# Patient Record
Sex: FEMALE | HISPANIC OR LATINO | Employment: FULL TIME | URBAN - METROPOLITAN AREA
[De-identification: names, ages, dates, MRNs, and addresses within clinical notes are randomized per-mention and may not be internally consistent; named-entity substitution may affect disease eponyms.]

---

## 2018-04-05 ENCOUNTER — OFFICE VISIT (OUTPATIENT)
Dept: FAMILY MEDICINE CLINIC | Facility: CLINIC | Age: 38
End: 2018-04-05

## 2018-04-05 ENCOUNTER — LAB (OUTPATIENT)
Dept: LAB | Facility: HOSPITAL | Age: 38
End: 2018-04-05

## 2018-04-05 VITALS
HEART RATE: 73 BPM | WEIGHT: 184.6 LBS | OXYGEN SATURATION: 98 % | RESPIRATION RATE: 18 BRPM | HEIGHT: 66 IN | BODY MASS INDEX: 29.67 KG/M2 | DIASTOLIC BLOOD PRESSURE: 74 MMHG | SYSTOLIC BLOOD PRESSURE: 116 MMHG

## 2018-04-05 DIAGNOSIS — Z00.00 HEALTHCARE MAINTENANCE: Primary | ICD-10-CM

## 2018-04-05 DIAGNOSIS — Z00.00 HEALTHCARE MAINTENANCE: ICD-10-CM

## 2018-04-05 DIAGNOSIS — K21.9 GASTROESOPHAGEAL REFLUX DISEASE WITHOUT ESOPHAGITIS: ICD-10-CM

## 2018-04-05 LAB
25(OH)D3 SERPL-MCNC: 19.5 NG/ML
ALBUMIN SERPL-MCNC: 4.5 G/DL (ref 3.2–4.8)
ALBUMIN/GLOB SERPL: 1.5 G/DL (ref 1.5–2.5)
ALP SERPL-CCNC: 64 U/L (ref 25–100)
ALT SERPL W P-5'-P-CCNC: 23 U/L (ref 7–40)
ANION GAP SERPL CALCULATED.3IONS-SCNC: 10 MMOL/L (ref 3–11)
ARTICHOKE IGE QN: 123 MG/DL (ref 0–130)
AST SERPL-CCNC: 19 U/L (ref 0–33)
BASOPHILS # BLD AUTO: 0.02 10*3/MM3 (ref 0–0.2)
BASOPHILS NFR BLD AUTO: 0.3 % (ref 0–1)
BILIRUB BLD-MCNC: NEGATIVE MG/DL
BILIRUB SERPL-MCNC: 0.6 MG/DL (ref 0.3–1.2)
BUN BLD-MCNC: 14 MG/DL (ref 9–23)
BUN/CREAT SERPL: 20 (ref 7–25)
CALCIUM SPEC-SCNC: 9.3 MG/DL (ref 8.7–10.4)
CHLORIDE SERPL-SCNC: 97 MMOL/L (ref 99–109)
CHOLEST SERPL-MCNC: 203 MG/DL (ref 0–200)
CLARITY, POC: CLEAR
CO2 SERPL-SCNC: 27 MMOL/L (ref 20–31)
COLOR UR: YELLOW
CREAT BLD-MCNC: 0.7 MG/DL (ref 0.6–1.3)
CRP SERPL-MCNC: 0.14 MG/DL (ref 0–1)
DEPRECATED RDW RBC AUTO: 44.9 FL (ref 37–54)
EOSINOPHIL # BLD AUTO: 0.22 10*3/MM3 (ref 0–0.3)
EOSINOPHIL NFR BLD AUTO: 3.8 % (ref 0–3)
ERYTHROCYTE [DISTWIDTH] IN BLOOD BY AUTOMATED COUNT: 13.8 % (ref 11.3–14.5)
GFR SERPL CREATININE-BSD FRML MDRD: 112 ML/MIN/1.73
GFR SERPL CREATININE-BSD FRML MDRD: 93 ML/MIN/1.73
GLOBULIN UR ELPH-MCNC: 3 GM/DL
GLUCOSE BLD-MCNC: 91 MG/DL (ref 70–100)
GLUCOSE UR STRIP-MCNC: NEGATIVE MG/DL
HBA1C MFR BLD: 5.5 % (ref 4.8–5.6)
HCT VFR BLD AUTO: 40.7 % (ref 34.5–44)
HDLC SERPL-MCNC: 70 MG/DL (ref 40–60)
HGB BLD-MCNC: 13.7 G/DL (ref 11.5–15.5)
HIV1+2 AB SER QL: NORMAL
IMM GRANULOCYTES # BLD: 0.02 10*3/MM3 (ref 0–0.03)
IMM GRANULOCYTES NFR BLD: 0.3 % (ref 0–0.6)
KETONES UR QL: NEGATIVE
LEUKOCYTE EST, POC: NEGATIVE
LIPASE SERPL-CCNC: 34 U/L (ref 6–51)
LYMPHOCYTES # BLD AUTO: 1.88 10*3/MM3 (ref 0.6–4.8)
LYMPHOCYTES NFR BLD AUTO: 32.4 % (ref 24–44)
MCH RBC QN AUTO: 30 PG (ref 27–31)
MCHC RBC AUTO-ENTMCNC: 33.7 G/DL (ref 32–36)
MCV RBC AUTO: 89.1 FL (ref 80–99)
MONOCYTES # BLD AUTO: 0.31 10*3/MM3 (ref 0–1)
MONOCYTES NFR BLD AUTO: 5.3 % (ref 0–12)
NEUTROPHILS # BLD AUTO: 3.36 10*3/MM3 (ref 1.5–8.3)
NEUTROPHILS NFR BLD AUTO: 57.9 % (ref 41–71)
NITRITE UR-MCNC: NEGATIVE MG/ML
PH UR: 5.5 [PH] (ref 5–8)
PLATELET # BLD AUTO: 212 10*3/MM3 (ref 150–450)
PMV BLD AUTO: 9.7 FL (ref 6–12)
POTASSIUM BLD-SCNC: 4.2 MMOL/L (ref 3.5–5.5)
PROT SERPL-MCNC: 7.5 G/DL (ref 5.7–8.2)
PROT UR STRIP-MCNC: NEGATIVE MG/DL
RBC # BLD AUTO: 4.57 10*6/MM3 (ref 3.89–5.14)
RBC # UR STRIP: NEGATIVE /UL
SODIUM BLD-SCNC: 134 MMOL/L (ref 132–146)
SP GR UR: 1.02 (ref 1–1.03)
T4 FREE SERPL-MCNC: 1.09 NG/DL (ref 0.89–1.76)
TRIGL SERPL-MCNC: 59 MG/DL (ref 0–150)
TSH SERPL DL<=0.05 MIU/L-ACNC: 3.38 MIU/ML (ref 0.35–5.35)
URATE SERPL-MCNC: 3.5 MG/DL (ref 3.1–7.8)
UROBILINOGEN UR QL: NORMAL
WBC NRBC COR # BLD: 5.81 10*3/MM3 (ref 3.5–10.8)

## 2018-04-05 PROCEDURE — 86140 C-REACTIVE PROTEIN: CPT | Performed by: FAMILY MEDICINE

## 2018-04-05 PROCEDURE — 84439 ASSAY OF FREE THYROXINE: CPT | Performed by: FAMILY MEDICINE

## 2018-04-05 PROCEDURE — 99386 PREV VISIT NEW AGE 40-64: CPT | Performed by: FAMILY MEDICINE

## 2018-04-05 PROCEDURE — 83036 HEMOGLOBIN GLYCOSYLATED A1C: CPT | Performed by: FAMILY MEDICINE

## 2018-04-05 PROCEDURE — 81003 URINALYSIS AUTO W/O SCOPE: CPT | Performed by: FAMILY MEDICINE

## 2018-04-05 PROCEDURE — 85025 COMPLETE CBC W/AUTO DIFF WBC: CPT | Performed by: FAMILY MEDICINE

## 2018-04-05 PROCEDURE — 83690 ASSAY OF LIPASE: CPT | Performed by: FAMILY MEDICINE

## 2018-04-05 PROCEDURE — 82306 VITAMIN D 25 HYDROXY: CPT | Performed by: FAMILY MEDICINE

## 2018-04-05 PROCEDURE — 86481 TB AG RESPONSE T-CELL SUSP: CPT

## 2018-04-05 PROCEDURE — 84443 ASSAY THYROID STIM HORMONE: CPT | Performed by: FAMILY MEDICINE

## 2018-04-05 PROCEDURE — 36415 COLL VENOUS BLD VENIPUNCTURE: CPT

## 2018-04-05 PROCEDURE — 80061 LIPID PANEL: CPT | Performed by: FAMILY MEDICINE

## 2018-04-05 PROCEDURE — 84550 ASSAY OF BLOOD/URIC ACID: CPT | Performed by: FAMILY MEDICINE

## 2018-04-05 PROCEDURE — 80053 COMPREHEN METABOLIC PANEL: CPT | Performed by: FAMILY MEDICINE

## 2018-04-05 PROCEDURE — 36415 COLL VENOUS BLD VENIPUNCTURE: CPT | Performed by: FAMILY MEDICINE

## 2018-04-05 PROCEDURE — G0432 EIA HIV-1/HIV-2 SCREEN: HCPCS | Performed by: FAMILY MEDICINE

## 2018-04-05 RX ORDER — OMEPRAZOLE 20 MG/1
20 CAPSULE, DELAYED RELEASE ORAL DAILY
COMMUNITY
End: 2020-09-01 | Stop reason: SDUPTHER

## 2018-04-05 RX ORDER — CHLORDIAZEPOXIDE HYDROCHLORIDE 10 MG/1
10 CAPSULE, GELATIN COATED ORAL 2 TIMES DAILY PRN
COMMUNITY
End: 2018-04-05

## 2018-04-05 NOTE — PROGRESS NOTES
Subjective   Camila March is a 38 y.o. female.     History of Present Illness   New patient to the office.  She describes previous care at the [facility] by Dr. LUIS EDUARDO Andrea (records not available to review).  She is here for her annual fasting wellness evaluation.  She is current on her immunizations.  She voices concerns with chronic reflux and PPI use.    Review of Systems   Constitutional: Negative.    HENT: Negative.    Eyes: Negative.    Respiratory: Negative.    Cardiovascular: Negative.    Gastrointestinal: Negative.         Chronic reflux   Endocrine: Negative.    Genitourinary: Negative.    Musculoskeletal: Negative.    Skin: Negative.    Allergic/Immunologic: Negative.    Neurological: Negative.    Hematological: Negative.    Psychiatric/Behavioral: Negative.        Objective   Physical Exam   Constitutional: She is oriented to person, place, and time. She appears well-developed and well-nourished. She is cooperative.   HENT:   Head: Normocephalic and atraumatic.   Right Ear: Hearing and external ear normal.   Left Ear: Hearing and external ear normal.   Nose: Nose normal.   Mouth/Throat: Uvula is midline, oropharynx is clear and moist and mucous membranes are normal.   Eyes: Conjunctivae and EOM are normal. Pupils are equal, round, and reactive to light. No scleral icterus.   Neck: Trachea normal and normal range of motion. Neck supple. No JVD present. Carotid bruit is not present. No thyromegaly present.   Cardiovascular: Normal rate, regular rhythm, normal heart sounds and intact distal pulses.    Pulmonary/Chest: Effort normal and breath sounds normal.   Abdominal: Soft. Bowel sounds are normal. There is no hepatosplenomegaly. There is no tenderness.   Musculoskeletal: Normal range of motion.   Lymphadenopathy:     She has no cervical adenopathy.   Neurological: She is alert and oriented to person, place, and time. She has normal strength and normal reflexes. No sensory deficit. Gait  normal.   Skin: Skin is warm and dry.   Psychiatric: She has a normal mood and affect. Her speech is normal and behavior is normal. Judgment and thought content normal. Cognition and memory are normal.   Nursing note and vitals reviewed.      Assessment/Plan   Diagnoses and all orders for this visit:    Healthcare maintenance  -     POC Urinalysis Dipstick, Automated  -     Comprehensive Metabolic Panel  -     CBC & Differential  -     Lipid Panel  -     TSH  -     T4, Free  -     Uric Acid  -     C-reactive Protein  -     HIV-1 / O / 2 Ag / Antibody 4th Generation  -     Hemoglobin A1c  -     Vitamin D 25 Hydroxy  -     TSPOT ordered (employment requirement)    Gastroesophageal reflux disease without esophagitis  -     Comprehensive Metabolic Panel  -     CBC & Differential  -     Ambulatory referral for Screening EGD  -     Lipase  - HOB elevated  - Weight loss  - Avoid late night meals  - Avoid excessive caffeine   - Avoid excessive carbonated beverages  - Report any dysphagia or odynophagia    The patient is here for a health maintenance visit.  Currently, the patient consumes a calorie enriched diet and has an inadequate exercise regimen. Screening lab work is ordered.  Immunizations are reported as current.  Advice and education is given regarding nutrition, aerobic exercise, routine dental evaluations, routine eye exams, reproductive health, cardiovascular risk reduction, sunscreen use, self skin examination (annual dermatology evaluations) and seat belt use (general overall safety).  Further recommendations after lab evaluation.  Annual wellness evaluations recommended.

## 2018-04-09 LAB — REF LAB TEST METHOD: NORMAL

## 2018-04-27 PROBLEM — Z01.419 WELL WOMAN EXAM WITH ROUTINE GYNECOLOGICAL EXAM: Status: ACTIVE | Noted: 2018-04-27

## 2018-05-02 ENCOUNTER — OFFICE VISIT (OUTPATIENT)
Dept: OBSTETRICS AND GYNECOLOGY | Facility: CLINIC | Age: 38
End: 2018-05-02

## 2018-05-02 VITALS
SYSTOLIC BLOOD PRESSURE: 118 MMHG | WEIGHT: 185 LBS | BODY MASS INDEX: 29.86 KG/M2 | RESPIRATION RATE: 14 BRPM | DIASTOLIC BLOOD PRESSURE: 74 MMHG

## 2018-05-02 DIAGNOSIS — Z01.419 WELL WOMAN EXAM WITH ROUTINE GYNECOLOGICAL EXAM: Primary | ICD-10-CM

## 2018-05-02 PROCEDURE — 99386 PREV VISIT NEW AGE 40-64: CPT | Performed by: OBSTETRICS & GYNECOLOGY

## 2018-11-19 ENCOUNTER — OFFICE VISIT (OUTPATIENT)
Dept: FAMILY MEDICINE CLINIC | Facility: CLINIC | Age: 38
End: 2018-11-19

## 2018-11-19 VITALS
WEIGHT: 183 LBS | TEMPERATURE: 97.7 F | HEIGHT: 66 IN | BODY MASS INDEX: 29.41 KG/M2 | OXYGEN SATURATION: 95 % | SYSTOLIC BLOOD PRESSURE: 112 MMHG | HEART RATE: 87 BPM | DIASTOLIC BLOOD PRESSURE: 60 MMHG | RESPIRATION RATE: 16 BRPM

## 2018-11-19 DIAGNOSIS — J02.9 SORE THROAT: Primary | ICD-10-CM

## 2018-11-19 DIAGNOSIS — J03.90 TONSILLITIS: ICD-10-CM

## 2018-11-19 LAB
EXPIRATION DATE: NORMAL
INTERNAL CONTROL: NORMAL
Lab: NORMAL
S PYO AG THROAT QL: NEGATIVE

## 2018-11-19 PROCEDURE — 87070 CULTURE OTHR SPECIMN AEROBIC: CPT | Performed by: NURSE PRACTITIONER

## 2018-11-19 PROCEDURE — 87880 STREP A ASSAY W/OPTIC: CPT | Performed by: NURSE PRACTITIONER

## 2018-11-19 PROCEDURE — 99213 OFFICE O/P EST LOW 20 MIN: CPT | Performed by: NURSE PRACTITIONER

## 2018-11-19 RX ORDER — AMOXICILLIN AND CLAVULANATE POTASSIUM 875; 125 MG/1; MG/1
1 TABLET, FILM COATED ORAL 2 TIMES DAILY
Qty: 20 TABLET | Refills: 0 | Status: SHIPPED | OUTPATIENT
Start: 2018-11-19 | End: 2018-11-29

## 2018-11-20 NOTE — PATIENT INSTRUCTIONS
Tonsillitis  Tonsillitis is an infection of the throat. This infection causes the tonsils to become red, tender, and swollen. Tonsils are tissues in the back of your throat. If bacteria caused your infection, antibiotic medicine will be given to you. Sometimes, symptoms of this infection can be helped with the use of steroid medicine. If your tonsillitis is very bad (severe) and happens often, you may need to get your tonsils removed (tonsillectomy).  Follow these instructions at home:  Medicines  · Take over-the-counter and prescription medicines only as told by your doctor.  · If you were prescribed an antibiotic, take it as told by your doctor. Do not stop taking the antibiotic even if you start to feel better.  Eating and drinking  · Drink enough fluid to keep your pee (urine) clear or pale yellow.  · While your throat is sore, eat soft or liquid foods like:  ? Soup.  ? Sherbert.  ? Instant breakfast drinks.  · Drink warm fluids.  · Eat frozen ice pops.  General instructions  · Rest as much as possible and get plenty of sleep.  · Gargle with a salt-water mixture 3-4 times a day or as needed. To make a salt-water mixture, completely dissolve ½-1 tsp of salt in 1 cup of warm water.  · Wash your hands often with soap and water. If there is no soap and water, use hand .  · Do not share cups, bottles, or other utensils until your symptoms are gone.  · Do not smoke. If you need help quitting, ask your doctor.  · Keep all follow-up visits as told by your doctor. This is important.  Contact a doctor if:  · You have large, tender lumps in your neck.  · You have a fever that does not go away after 2-3 days.  · You have a rash.  · You cough up green, yellow-brown, or bloody fluid.  · You cannot swallow liquids or food for 24 hours.  · Only one of your tonsils is swollen.  Get help right away if:  · You have any new symptoms such as:  ? Vomiting  ? Very bad headache  ? Stiff neck  ? Chest pain  ? Trouble breathing  or swallowing  · You have very bad throat pain and also have drooling or voice changes.  · You have very bad pain that is not helped by medicine.  · You cannot fully open your mouth.  · You have redness, swelling, or severe pain anywhere in your neck.  Summary  · Tonsillitis causes your tonsils to be red, tender, and swollen.  · While your throat is sore eat soft or liquid foods.  · Gargle with a salt-water mixture 3-4 times a day or as needed.  · Do not share cups, bottles, or other utensils until your symptoms are gone.  This information is not intended to replace advice given to you by your health care provider. Make sure you discuss any questions you have with your health care provider.  Document Released: 06/05/2009 Document Revised: 05/25/2017 Document Reviewed: 06/06/2014  [employer] Interactive Patient Education © 2017 [employer]

## 2018-11-20 NOTE — PROGRESS NOTES
"Stacy March is a 38 y.o. female.   Chief Complaint   Patient presents with   • Sore Throat     sore throat, fatigue    walk in  History of Present Illness   Patient is here for sore throat, chills, fatigue - started on Friday, Taking michael seltzer. Denies fever, nausea, vomiting or diarrhea.   Sick children exposure   Works as a .     The following portions of the patient's history were reviewed and updated as appropriate: allergies, current medications, past family history, past medical history, past social history, past surgical history and problem list.    Review of Systems   Constitutional: Positive for chills and fatigue. Negative for fever.   HENT: Positive for congestion, rhinorrhea and sore throat. Negative for sinus pressure.         Green nasal drainage   Throat burns    Respiratory: Positive for cough. Negative for chest tightness, shortness of breath and wheezing.    Cardiovascular: Negative.    Gastrointestinal: Negative.    Neurological: Negative for headaches.       Objective   No Known Allergies  Visit Vitals  /60   Pulse 87   Temp 97.7 °F (36.5 °C)   Resp 16   Ht 167.6 cm (66\")   Wt 83 kg (183 lb)   SpO2 95%   BMI 29.54 kg/m²       Physical Exam   Constitutional: She is oriented to person, place, and time. Vital signs are normal. She appears well-developed and well-nourished. She is cooperative. She appears ill.   HENT:   Head: Normocephalic.   Right Ear: Hearing, tympanic membrane, external ear and ear canal normal.   Left Ear: Hearing, tympanic membrane, external ear and ear canal normal.   Nose: Nose normal. Right sinus exhibits no maxillary sinus tenderness and no frontal sinus tenderness. Left sinus exhibits no maxillary sinus tenderness and no frontal sinus tenderness.   Mouth/Throat: Posterior oropharyngeal erythema present. Tonsils are 3+ on the right. Tonsils are 3+ on the left. Tonsillar exudate.   Eyes: Pupils are equal, round, and reactive to " light.   Cardiovascular: Normal rate and regular rhythm.   Pulmonary/Chest: Effort normal and breath sounds normal.   Lymphadenopathy:     She has cervical adenopathy.   Neurological: She is alert and oriented to person, place, and time.   Skin: Skin is warm and dry. Capillary refill takes less than 2 seconds.   Psychiatric: She has a normal mood and affect. Her behavior is normal.   Vitals reviewed.      Assessment/Plan   Camila was seen today for sore throat.    Diagnoses and all orders for this visit:    Sore throat  -     POCT rapid strep A    Tonsillitis  -     Throat / Upper Respiratory Culture - Swab, Throat  -     amoxicillin-clavulanate (AUGMENTIN) 875-125 MG per tablet; Take 1 tablet by mouth 2 (Two) Times a Day for 10 days.       POCT rapid strep A - Negative.  Will send swab for throat culture.   See patient instructions for ireneilliris            Patient Instructions   Tonsillitis  Tonsillitis is an infection of the throat. This infection causes the tonsils to become red, tender, and swollen. Tonsils are tissues in the back of your throat. If bacteria caused your infection, antibiotic medicine will be given to you. Sometimes, symptoms of this infection can be helped with the use of steroid medicine. If your tonsillitis is very bad (severe) and happens often, you may need to get your tonsils removed (tonsillectomy).  Follow these instructions at home:  Medicines  · Take over-the-counter and prescription medicines only as told by your doctor.  · If you were prescribed an antibiotic, take it as told by your doctor. Do not stop taking the antibiotic even if you start to feel better.  Eating and drinking  · Drink enough fluid to keep your pee (urine) clear or pale yellow.  · While your throat is sore, eat soft or liquid foods like:  ? Soup.  ? Sherbert.  ? Instant breakfast drinks.  · Drink warm fluids.  · Eat frozen ice pops.  General instructions  · Rest as much as possible and get plenty of sleep.  · Gargle  with a salt-water mixture 3-4 times a day or as needed. To make a salt-water mixture, completely dissolve ½-1 tsp of salt in 1 cup of warm water.  · Wash your hands often with soap and water. If there is no soap and water, use hand .  · Do not share cups, bottles, or other utensils until your symptoms are gone.  · Do not smoke. If you need help quitting, ask your doctor.  · Keep all follow-up visits as told by your doctor. This is important.  Contact a doctor if:  · You have large, tender lumps in your neck.  · You have a fever that does not go away after 2-3 days.  · You have a rash.  · You cough up green, yellow-brown, or bloody fluid.  · You cannot swallow liquids or food for 24 hours.  · Only one of your tonsils is swollen.  Get help right away if:  · You have any new symptoms such as:  ? Vomiting  ? Very bad headache  ? Stiff neck  ? Chest pain  ? Trouble breathing or swallowing  · You have very bad throat pain and also have drooling or voice changes.  · You have very bad pain that is not helped by medicine.  · You cannot fully open your mouth.  · You have redness, swelling, or severe pain anywhere in your neck.  Summary  · Tonsillitis causes your tonsils to be red, tender, and swollen.  · While your throat is sore eat soft or liquid foods.  · Gargle with a salt-water mixture 3-4 times a day or as needed.  · Do not share cups, bottles, or other utensils until your symptoms are gone.  This information is not intended to replace advice given to you by your health care provider. Make sure you discuss any questions you have with your health care provider.  Document Released: 06/05/2009 Document Revised: 05/25/2017 Document Reviewed: 06/06/2014  [employer] Interactive Patient Education © 2017 [employer]        Lanie Cruz, APRN

## 2018-11-22 LAB
BACTERIA SPEC AEROBE CULT: NORMAL
BACTERIA SPEC AEROBE CULT: NORMAL

## 2019-04-23 ENCOUNTER — OFFICE VISIT (OUTPATIENT)
Dept: FAMILY MEDICINE CLINIC | Facility: CLINIC | Age: 39
End: 2019-04-23

## 2019-04-23 VITALS
SYSTOLIC BLOOD PRESSURE: 116 MMHG | DIASTOLIC BLOOD PRESSURE: 72 MMHG | TEMPERATURE: 98.3 F | OXYGEN SATURATION: 98 % | RESPIRATION RATE: 16 BRPM | BODY MASS INDEX: 29.99 KG/M2 | HEIGHT: 66 IN | WEIGHT: 186.6 LBS | HEART RATE: 84 BPM

## 2019-04-23 DIAGNOSIS — Z00.00 HEALTHCARE MAINTENANCE: Primary | ICD-10-CM

## 2019-04-23 PROBLEM — Z01.419 WELL WOMAN EXAM WITH ROUTINE GYNECOLOGICAL EXAM: Status: RESOLVED | Noted: 2018-04-27 | Resolved: 2019-04-23

## 2019-04-23 LAB
BILIRUB BLD-MCNC: NEGATIVE MG/DL
CLARITY, POC: CLEAR
COLOR UR: YELLOW
GLUCOSE UR STRIP-MCNC: NEGATIVE MG/DL
KETONES UR QL: NEGATIVE
LEUKOCYTE EST, POC: NEGATIVE
NITRITE UR-MCNC: NEGATIVE MG/ML
PH UR: 6 [PH] (ref 5–8)
PROT UR STRIP-MCNC: NEGATIVE MG/DL
RBC # UR STRIP: ABNORMAL /UL
SP GR UR: 1.01 (ref 1–1.03)
UROBILINOGEN UR QL: NORMAL

## 2019-04-23 PROCEDURE — 86140 C-REACTIVE PROTEIN: CPT | Performed by: FAMILY MEDICINE

## 2019-04-23 PROCEDURE — 80061 LIPID PANEL: CPT | Performed by: FAMILY MEDICINE

## 2019-04-23 PROCEDURE — G0432 EIA HIV-1/HIV-2 SCREEN: HCPCS | Performed by: FAMILY MEDICINE

## 2019-04-23 PROCEDURE — 81003 URINALYSIS AUTO W/O SCOPE: CPT | Performed by: FAMILY MEDICINE

## 2019-04-23 PROCEDURE — 80053 COMPREHEN METABOLIC PANEL: CPT | Performed by: FAMILY MEDICINE

## 2019-04-23 PROCEDURE — 99396 PREV VISIT EST AGE 40-64: CPT | Performed by: FAMILY MEDICINE

## 2019-04-23 PROCEDURE — 36415 COLL VENOUS BLD VENIPUNCTURE: CPT | Performed by: FAMILY MEDICINE

## 2019-04-23 PROCEDURE — 84443 ASSAY THYROID STIM HORMONE: CPT | Performed by: FAMILY MEDICINE

## 2019-04-23 PROCEDURE — 85025 COMPLETE CBC W/AUTO DIFF WBC: CPT | Performed by: FAMILY MEDICINE

## 2019-04-23 PROCEDURE — 84550 ASSAY OF BLOOD/URIC ACID: CPT | Performed by: FAMILY MEDICINE

## 2019-04-23 PROCEDURE — 83036 HEMOGLOBIN GLYCOSYLATED A1C: CPT | Performed by: FAMILY MEDICINE

## 2019-04-23 NOTE — PROGRESS NOTES
Subjective   Camila March is a 39 y.o. female.     History of Present Illness   She is here for her annual fasting wellness evaluation.  She is current on her immunizations.  She voices no acute symptoms.  She continues care with her gynecologist.    Review of Systems   Constitutional: Negative.    HENT: Negative.    Eyes: Negative.    Respiratory: Negative.    Cardiovascular: Negative.    Gastrointestinal: Negative.    Endocrine: Negative.    Genitourinary: Negative.    Musculoskeletal: Negative.    Skin: Negative.    Allergic/Immunologic: Negative.    Neurological: Negative.    Hematological: Negative.    Psychiatric/Behavioral: Negative.        Objective   Physical Exam   Constitutional: She is oriented to person, place, and time. She appears well-developed and well-nourished. She is cooperative.   HENT:   Head: Normocephalic and atraumatic.   Right Ear: Hearing and external ear normal.   Left Ear: Hearing and external ear normal.   Nose: Nose normal.   Mouth/Throat: Uvula is midline, oropharynx is clear and moist and mucous membranes are normal.   Eyes: Conjunctivae and EOM are normal. Pupils are equal, round, and reactive to light. No scleral icterus.   Neck: Trachea normal and normal range of motion. Neck supple. No JVD present. Carotid bruit is not present. No thyromegaly present.   Cardiovascular: Normal rate, regular rhythm, normal heart sounds and intact distal pulses.   Pulmonary/Chest: Effort normal and breath sounds normal.   Abdominal: Soft. Bowel sounds are normal. There is no hepatosplenomegaly. There is no tenderness.   Musculoskeletal: Normal range of motion.   Lymphadenopathy:     She has no cervical adenopathy.   Neurological: She is alert and oriented to person, place, and time. She has normal strength and normal reflexes. No sensory deficit. Gait normal.   Skin: Skin is warm and dry.   Psychiatric: She has a normal mood and affect. Her speech is normal and behavior is normal. Judgment  and thought content normal. Cognition and memory are normal.   Nursing note and vitals reviewed.      Assessment/Plan   Diagnoses and all orders for this visit:    Healthcare maintenance  -     POC Urinalysis Dipstick, Automated  -     Comprehensive Metabolic Panel  -     CBC & Differential  -     Lipid Panel  -     TSH  -     Uric Acid  -     C-reactive Protein  -     HIV-1 / O / 2 Ag / Antibody 4th Generation  -     Hemoglobin A1c    The patient is here for a health maintenance visit.  Currently, the patient consumes a calorie enriched diet and has an inadequate exercise regimen. Screening lab work is ordered.  Immunizations are reported as current.  Advice and education is given regarding nutrition, aerobic exercise, routine dental evaluations, routine eye exams, reproductive health, cardiovascular risk reduction, sunscreen use, self skin examination (annual dermatology evaluations) and seat belt use (general overall safety).  Further recommendations after lab evaluation.  Annual wellness evaluations recommended.

## 2019-04-24 LAB
ALBUMIN SERPL-MCNC: 4.3 G/DL (ref 3.5–5.2)
ALBUMIN/GLOB SERPL: 1.3 G/DL
ALP SERPL-CCNC: 63 U/L (ref 39–117)
ALT SERPL W P-5'-P-CCNC: 19 U/L (ref 1–33)
ANION GAP SERPL CALCULATED.3IONS-SCNC: 11.8 MMOL/L
AST SERPL-CCNC: 17 U/L (ref 1–32)
BASOPHILS # BLD AUTO: 0.03 10*3/MM3 (ref 0–0.2)
BASOPHILS NFR BLD AUTO: 0.5 % (ref 0–1.5)
BILIRUB SERPL-MCNC: 0.2 MG/DL (ref 0.2–1.2)
BUN BLD-MCNC: 12 MG/DL (ref 6–20)
BUN/CREAT SERPL: 17.1 (ref 7–25)
CALCIUM SPEC-SCNC: 9.3 MG/DL (ref 8.6–10.5)
CHLORIDE SERPL-SCNC: 100 MMOL/L (ref 98–107)
CHOLEST SERPL-MCNC: 181 MG/DL (ref 0–200)
CO2 SERPL-SCNC: 28.2 MMOL/L (ref 22–29)
CREAT BLD-MCNC: 0.7 MG/DL (ref 0.57–1)
CRP SERPL-MCNC: 0.92 MG/DL (ref 0–0.5)
DEPRECATED RDW RBC AUTO: 45.1 FL (ref 37–54)
EOSINOPHIL # BLD AUTO: 0.14 10*3/MM3 (ref 0–0.4)
EOSINOPHIL NFR BLD AUTO: 2.3 % (ref 0.3–6.2)
ERYTHROCYTE [DISTWIDTH] IN BLOOD BY AUTOMATED COUNT: 13.2 % (ref 12.3–15.4)
GFR SERPL CREATININE-BSD FRML MDRD: 112 ML/MIN/1.73
GFR SERPL CREATININE-BSD FRML MDRD: 92 ML/MIN/1.73
GLOBULIN UR ELPH-MCNC: 3.4 GM/DL
GLUCOSE BLD-MCNC: 114 MG/DL (ref 65–99)
HBA1C MFR BLD: 5.1 % (ref 4.8–5.6)
HCT VFR BLD AUTO: 37 % (ref 34–46.6)
HDLC SERPL-MCNC: 56 MG/DL (ref 40–60)
HGB BLD-MCNC: 11.6 G/DL (ref 12–15.9)
HIV1+2 AB SER QL: NORMAL
IMM GRANULOCYTES # BLD AUTO: 0.01 10*3/MM3 (ref 0–0.05)
IMM GRANULOCYTES NFR BLD AUTO: 0.2 % (ref 0–0.5)
LDLC SERPL CALC-MCNC: 108 MG/DL (ref 0–100)
LDLC/HDLC SERPL: 1.93 {RATIO}
LYMPHOCYTES # BLD AUTO: 2 10*3/MM3 (ref 0.7–3.1)
LYMPHOCYTES NFR BLD AUTO: 33.4 % (ref 19.6–45.3)
MCH RBC QN AUTO: 28.9 PG (ref 26.6–33)
MCHC RBC AUTO-ENTMCNC: 31.4 G/DL (ref 31.5–35.7)
MCV RBC AUTO: 92 FL (ref 79–97)
MONOCYTES # BLD AUTO: 0.38 10*3/MM3 (ref 0.1–0.9)
MONOCYTES NFR BLD AUTO: 6.3 % (ref 5–12)
NEUTROPHILS # BLD AUTO: 3.43 10*3/MM3 (ref 1.7–7)
NEUTROPHILS NFR BLD AUTO: 57.3 % (ref 42.7–76)
NRBC BLD AUTO-RTO: 0 /100 WBC (ref 0–0.2)
PLATELET # BLD AUTO: 263 10*3/MM3 (ref 140–450)
PMV BLD AUTO: 10.1 FL (ref 6–12)
POTASSIUM BLD-SCNC: 4.3 MMOL/L (ref 3.5–5.2)
PROT SERPL-MCNC: 7.7 G/DL (ref 6–8.5)
RBC # BLD AUTO: 4.02 10*6/MM3 (ref 3.77–5.28)
SODIUM BLD-SCNC: 140 MMOL/L (ref 136–145)
TRIGL SERPL-MCNC: 86 MG/DL (ref 0–150)
TSH SERPL DL<=0.05 MIU/L-ACNC: 4.4 MIU/ML (ref 0.27–4.2)
URATE SERPL-MCNC: 3.5 MG/DL (ref 2.4–5.7)
VLDLC SERPL-MCNC: 17.2 MG/DL (ref 5–40)
WBC NRBC COR # BLD: 5.99 10*3/MM3 (ref 3.4–10.8)

## 2019-04-27 NOTE — PROGRESS NOTES
Your TSH (thyroid screening test) is mildly abnormal and should be rechecked in three months.  Otherwise the rest of your lab results are all stable and satisfactory.  If you have any questions or concerns, please don't hesitate to contact us.  Thank you.

## 2019-06-03 ENCOUNTER — OFFICE VISIT (OUTPATIENT)
Dept: OBSTETRICS AND GYNECOLOGY | Facility: CLINIC | Age: 39
End: 2019-06-03

## 2019-06-03 VITALS
SYSTOLIC BLOOD PRESSURE: 124 MMHG | DIASTOLIC BLOOD PRESSURE: 86 MMHG | WEIGHT: 188.6 LBS | BODY MASS INDEX: 30.31 KG/M2 | HEIGHT: 66 IN

## 2019-06-03 DIAGNOSIS — Z11.3 ROUTINE SCREENING FOR STI (SEXUALLY TRANSMITTED INFECTION): ICD-10-CM

## 2019-06-03 DIAGNOSIS — Z01.419 ENCNTR FOR GYN EXAM (GENERAL) (ROUTINE) W/O ABN FINDINGS: Primary | ICD-10-CM

## 2019-06-03 DIAGNOSIS — Z01.419 ENCNTR FOR GYN EXAM (GENERAL) (ROUTINE) W/O ABN FINDINGS: ICD-10-CM

## 2019-06-03 PROCEDURE — 99396 PREV VISIT EST AGE 40-64: CPT | Performed by: OBSTETRICS & GYNECOLOGY

## 2019-06-03 NOTE — PROGRESS NOTES
Subjective   Chief Complaint   Patient presents with   • Gynecologic Exam     annual     Camila March is a 39 y.o. year old  presenting to be seen for her annual exam. Last mammogram: 2019. Last pap smear: 2018 -- negative cytology.    SEXUAL Hx:  She is currently sexually active.  In the past year there there has been NO new sexual partners.    Condoms are never used.  She would like to be screened for STD's at today's exam.  Current birth control method: not using any form of contraception.  She is not trying to conceive but would be OK if she did get pregnant.  She is happy with her current method of contraception and does not want to discuss alternative methods of contraception.  MENSTRUAL Hx:  Patient's last menstrual period was 2019 (exact date).  In the past 6 months her cycles have been regular, predictable and occur monthly.  Her menstrual flow is typically normal.   Each month on average there are roughly 2 day(s) of very heavy flow.    Intermenstrual bleeding is absent.    Post-coital bleeding is absent.  Dysmenorrhea: mild and is not affecting her activities of daily living  PMS: mild and is not affecting her activities of daily living  Her cycles are not a source of concern for her that she wishes to discuss today.  HEALTH Hx:  She exercises regularly: no (and has no plans to become more active).  She wears her seat belt: yes.  She has concerns about domestic violence: no.  OTHER THINGS SHE WANTS TO DISCUSS TODAY:  Nothing else    The following portions of the patient's history were reviewed and updated as appropriate:problem list, current medications, allergies, past family history, past medical history, past social history and past surgical history.    Social History    Tobacco Use      Smoking status: Never Smoker      Smokeless tobacco: Never Used    Review of Systems  Constitutional POS: nothing reported    NEG: anorexia or night sweats   Genitourinary POS: nothing reported  "   NEG: dysuria or hematuria      Gastointestinal POS: nothing reported    NEG: bloating, change in bowel habits, melena or reflux symptoms   Integument POS: nothing reported    NEG: moles that are changing in size, shape, color or rashes   Breast POS: nothing reported    NEG: persistent breast lump, skin dimpling or nipple discharge        Objective   /86   Ht 167.6 cm (66\")   Wt 85.5 kg (188 lb 9.6 oz)   LMP 05/13/2019 (Exact Date)   Breastfeeding? No   BMI 30.44 kg/m²     General:  well developed; well nourished  no acute distress   Skin:  No suspicious lesions seen   Thyroid: normal to inspection and palpation   Breasts:  Examined in supine position  Symmetric without masses or skin dimpling  Nipples normal without inversion, lesions or discharge  There are no palpable axillary nodes   Abdomen: soft, non-tender; no masses  no umbilical or inguinal hernias are present  no hepato-splenomegaly   Pelvis: Clinical staff was present for exam  External genitalia:  normal appearance of the external genitalia including [place]'s and [place]'s glands.  :  urethral meatus normal;  Vaginal:  normal pink mucosa without prolapse or lesions.  Cervix:  normal appearance.  Uterus:  normal size, shape and consistency.  Adnexa:  normal bimanual exam of the adnexa.        Assessment   1. Normal GYN exam     Plan   1. Pap was done today per patient request even though it has been less then 3 years since her last Pap smear.  If she does not receive the results of the Pap within 2 weeks  time, she was instructed to call to find out the results.  I explained to Camila that the recommendations for Pap smear interval in a low risk patient's has lengthened to 3 years time when testing cytology alone and to 5 years time when testing cytology and HPV.  I encouraged her to be seen yearly for a full physical exam including breast and pelvic exam even during the off years when PAP's will not be performed.  2. She was encouraged to " get yearly mammograms.  She should report any palpable breast lump(s) or skin changes regardless of mammographic findings.  I explained to Camila that notification regarding her mammogram results will come from the center performing the study.  Our office will not be routinely calling with mammogram results.  It is her responsibility to make sure that the results from the mammogram are communicated to her by the breast center.  If she has any questions about the results, she is welcome to call our office anytime.  3. The following tests were ordered today: HIV, RPR, STD swabs for GC, chlamydia and trichimoniasis and Hepatitis B&C.  It was explained to Camila that all lab test should be back within the one week after they are performed. She will be notified about the results, regardless of the findings. If she has not been contacted by the office within 2 weeks after the test has been performed, it is her responsibility to contact us to learn about her results.  4. No prescription was given or electronically sent at today's visit  5. The importance of keeping all planned follow-up and taking all medications as prescribed was emphasized.  6. Follow up for annual exam in 1 year.    No orders of the defined types were placed in this encounter.         This note was electronically signed.    Lucy Hawley, DO  Janette 3, 2019    Note: Speech recognition transcription software may have been used to create portions of this document.  An attempt at proofreading has been made but errors in transcription could still be present.

## 2019-06-06 ENCOUNTER — OFFICE VISIT (OUTPATIENT)
Dept: OBSTETRICS AND GYNECOLOGY | Facility: CLINIC | Age: 39
End: 2019-06-06

## 2019-06-06 VITALS
HEIGHT: 66 IN | DIASTOLIC BLOOD PRESSURE: 78 MMHG | SYSTOLIC BLOOD PRESSURE: 130 MMHG | WEIGHT: 189.2 LBS | BODY MASS INDEX: 30.41 KG/M2

## 2019-06-06 DIAGNOSIS — R87.810 CERVICAL HIGH RISK HUMAN PAPILLOMAVIRUS (HPV) DNA TEST POSITIVE: Primary | ICD-10-CM

## 2019-06-06 PROCEDURE — 57455 BIOPSY OF CERVIX W/SCOPE: CPT | Performed by: OBSTETRICS & GYNECOLOGY

## 2019-06-06 NOTE — PROGRESS NOTES
Colposcopy    Date of procedure:  6/6/2019   Risks and benefits discussed? yes   All questions answered? yes   Consents given by: patient   Written consent obtained? 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  The cervix was next bathed in acetic acid.   The findings were as follows:    Transformation zone seen? Yes   Findings: 1. Punctation noted at 2 o'clock  2. HPV changes noted at 2 o'clock   Ectocervical biopsies: taken from 2 o'clock.  Monsels solution was applied to the biopsy sites.   Endocervical curettage: not performed               Colposcopic Impression: 1. Mild dysplasia  2. Adequate colposcopy  3. Colposcopic findings are consistent with cytology       Plan: Will base further treatment on pathology results  Post biopsy instructions given to patient.  Specimens labelled and sent to pathology.         This note was electronically signed.    Lucy Hawley, DO  June 6, 2019

## 2020-02-07 ENCOUNTER — OFFICE VISIT (OUTPATIENT)
Dept: FAMILY MEDICINE CLINIC | Facility: CLINIC | Age: 40
End: 2020-02-07

## 2020-02-07 ENCOUNTER — APPOINTMENT (OUTPATIENT)
Dept: LAB | Facility: HOSPITAL | Age: 40
End: 2020-02-07

## 2020-02-07 VITALS
OXYGEN SATURATION: 99 % | HEIGHT: 66 IN | HEART RATE: 80 BPM | WEIGHT: 194 LBS | BODY MASS INDEX: 31.18 KG/M2 | SYSTOLIC BLOOD PRESSURE: 118 MMHG | DIASTOLIC BLOOD PRESSURE: 80 MMHG

## 2020-02-07 DIAGNOSIS — G89.29 CHRONIC RIGHT SHOULDER PAIN: ICD-10-CM

## 2020-02-07 DIAGNOSIS — M25.511 CHRONIC RIGHT SHOULDER PAIN: ICD-10-CM

## 2020-02-07 DIAGNOSIS — J45.20 MILD INTERMITTENT ASTHMA WITHOUT COMPLICATION: ICD-10-CM

## 2020-02-07 DIAGNOSIS — Z92.29 HISTORY OF BCG VACCINATION: ICD-10-CM

## 2020-02-07 DIAGNOSIS — J20.9 ACUTE BRONCHITIS, UNSPECIFIED ORGANISM: ICD-10-CM

## 2020-02-07 DIAGNOSIS — Z00.00 PHYSICAL EXAM, ANNUAL: ICD-10-CM

## 2020-02-07 DIAGNOSIS — K21.00 GASTROESOPHAGEAL REFLUX DISEASE WITH ESOPHAGITIS: ICD-10-CM

## 2020-02-07 DIAGNOSIS — J30.2 SEASONAL ALLERGIES: ICD-10-CM

## 2020-02-07 DIAGNOSIS — E55.9 VITAMIN D DEFICIENCY: ICD-10-CM

## 2020-02-07 DIAGNOSIS — Z76.89 ENCOUNTER TO ESTABLISH CARE: Primary | ICD-10-CM

## 2020-02-07 DIAGNOSIS — Z23 NEED FOR HPV VACCINE: ICD-10-CM

## 2020-02-07 LAB
25(OH)D3 SERPL-MCNC: 27.4 NG/ML (ref 30–100)
ALBUMIN SERPL-MCNC: 4.4 G/DL (ref 3.5–5.2)
ALBUMIN/GLOB SERPL: 1.4 G/DL
ALP SERPL-CCNC: 74 U/L (ref 39–117)
ALT SERPL W P-5'-P-CCNC: 99 U/L (ref 1–33)
ANION GAP SERPL CALCULATED.3IONS-SCNC: 12.6 MMOL/L (ref 5–15)
AST SERPL-CCNC: 35 U/L (ref 1–32)
BASOPHILS # BLD AUTO: 0.04 10*3/MM3 (ref 0–0.2)
BASOPHILS NFR BLD AUTO: 0.7 % (ref 0–1.5)
BILIRUB SERPL-MCNC: 0.2 MG/DL (ref 0.2–1.2)
BUN BLD-MCNC: 14 MG/DL (ref 6–20)
BUN/CREAT SERPL: 21.9 (ref 7–25)
CALCIUM SPEC-SCNC: 9.3 MG/DL (ref 8.6–10.5)
CHLORIDE SERPL-SCNC: 102 MMOL/L (ref 98–107)
CHOLEST SERPL-MCNC: 222 MG/DL (ref 0–200)
CO2 SERPL-SCNC: 24.4 MMOL/L (ref 22–29)
CREAT BLD-MCNC: 0.64 MG/DL (ref 0.57–1)
DEPRECATED RDW RBC AUTO: 40.6 FL (ref 37–54)
EOSINOPHIL # BLD AUTO: 0.28 10*3/MM3 (ref 0–0.4)
EOSINOPHIL NFR BLD AUTO: 4.8 % (ref 0.3–6.2)
ERYTHROCYTE [DISTWIDTH] IN BLOOD BY AUTOMATED COUNT: 13 % (ref 12.3–15.4)
GFR SERPL CREATININE-BSD FRML MDRD: 102 ML/MIN/1.73
GFR SERPL CREATININE-BSD FRML MDRD: 123 ML/MIN/1.73
GLOBULIN UR ELPH-MCNC: 3.1 GM/DL
GLUCOSE BLD-MCNC: 97 MG/DL (ref 65–99)
HCT VFR BLD AUTO: 41.3 % (ref 34–46.6)
HDLC SERPL-MCNC: 72 MG/DL (ref 40–60)
HGB BLD-MCNC: 13.9 G/DL (ref 12–15.9)
IMM GRANULOCYTES # BLD AUTO: 0.01 10*3/MM3 (ref 0–0.05)
IMM GRANULOCYTES NFR BLD AUTO: 0.2 % (ref 0–0.5)
LDLC SERPL CALC-MCNC: 140 MG/DL (ref 0–100)
LDLC/HDLC SERPL: 1.94 {RATIO}
LYMPHOCYTES # BLD AUTO: 2.04 10*3/MM3 (ref 0.7–3.1)
LYMPHOCYTES NFR BLD AUTO: 34.8 % (ref 19.6–45.3)
MCH RBC QN AUTO: 29.3 PG (ref 26.6–33)
MCHC RBC AUTO-ENTMCNC: 33.7 G/DL (ref 31.5–35.7)
MCV RBC AUTO: 86.9 FL (ref 79–97)
MONOCYTES # BLD AUTO: 0.42 10*3/MM3 (ref 0.1–0.9)
MONOCYTES NFR BLD AUTO: 7.2 % (ref 5–12)
NEUTROPHILS # BLD AUTO: 3.07 10*3/MM3 (ref 1.7–7)
NEUTROPHILS NFR BLD AUTO: 52.3 % (ref 42.7–76)
NRBC BLD AUTO-RTO: 0 /100 WBC (ref 0–0.2)
PLATELET # BLD AUTO: 242 10*3/MM3 (ref 140–450)
PMV BLD AUTO: 10 FL (ref 6–12)
POTASSIUM BLD-SCNC: 4.4 MMOL/L (ref 3.5–5.2)
PROT SERPL-MCNC: 7.5 G/DL (ref 6–8.5)
RBC # BLD AUTO: 4.75 10*6/MM3 (ref 3.77–5.28)
SODIUM BLD-SCNC: 139 MMOL/L (ref 136–145)
TRIGL SERPL-MCNC: 52 MG/DL (ref 0–150)
TSH SERPL DL<=0.05 MIU/L-ACNC: 3.88 UIU/ML (ref 0.27–4.2)
VLDLC SERPL-MCNC: 10.4 MG/DL (ref 5–40)
WBC NRBC COR # BLD: 5.86 10*3/MM3 (ref 3.4–10.8)

## 2020-02-07 PROCEDURE — 84443 ASSAY THYROID STIM HORMONE: CPT | Performed by: PHYSICIAN ASSISTANT

## 2020-02-07 PROCEDURE — 90471 IMMUNIZATION ADMIN: CPT | Performed by: PHYSICIAN ASSISTANT

## 2020-02-07 PROCEDURE — 36415 COLL VENOUS BLD VENIPUNCTURE: CPT | Performed by: PHYSICIAN ASSISTANT

## 2020-02-07 PROCEDURE — 99386 PREV VISIT NEW AGE 40-64: CPT | Performed by: PHYSICIAN ASSISTANT

## 2020-02-07 PROCEDURE — 86480 TB TEST CELL IMMUN MEASURE: CPT | Performed by: PHYSICIAN ASSISTANT

## 2020-02-07 PROCEDURE — 85025 COMPLETE CBC W/AUTO DIFF WBC: CPT | Performed by: PHYSICIAN ASSISTANT

## 2020-02-07 PROCEDURE — 82306 VITAMIN D 25 HYDROXY: CPT | Performed by: PHYSICIAN ASSISTANT

## 2020-02-07 PROCEDURE — 80061 LIPID PANEL: CPT | Performed by: PHYSICIAN ASSISTANT

## 2020-02-07 PROCEDURE — 90649 4VHPV VACCINE 3 DOSE IM: CPT | Performed by: PHYSICIAN ASSISTANT

## 2020-02-07 PROCEDURE — 80053 COMPREHEN METABOLIC PANEL: CPT | Performed by: PHYSICIAN ASSISTANT

## 2020-02-07 RX ORDER — AZITHROMYCIN 250 MG/1
TABLET, FILM COATED ORAL
Qty: 6 TABLET | Refills: 0 | Status: SHIPPED | OUTPATIENT
Start: 2020-02-07 | End: 2020-06-04

## 2020-02-07 RX ORDER — FEXOFENADINE HCL 180 MG/1
180 TABLET ORAL DAILY
Qty: 30 TABLET | Refills: 1 | Status: SHIPPED | OUTPATIENT
Start: 2020-02-07 | End: 2020-03-06 | Stop reason: SDUPTHER

## 2020-02-07 RX ORDER — MONTELUKAST SODIUM 10 MG/1
10 TABLET ORAL NIGHTLY
Qty: 30 TABLET | Refills: 1 | Status: SHIPPED | OUTPATIENT
Start: 2020-02-07 | End: 2020-03-06 | Stop reason: SDUPTHER

## 2020-02-07 RX ORDER — ALBUTEROL SULFATE 90 UG/1
2 AEROSOL, METERED RESPIRATORY (INHALATION) EVERY 4 HOURS PRN
Qty: 1 INHALER | Refills: 5 | Status: SHIPPED | OUTPATIENT
Start: 2020-02-07 | End: 2021-12-10 | Stop reason: SDUPTHER

## 2020-02-07 NOTE — PATIENT INSTRUCTIONS
TREAT ALLERGIES:  Recommend stoping cetirizine.    Start allegra 180 mg in the morning with singulair 10 mg daily.    Use nasal saline flush every night (generic for ocean spray or a neti pot with distilled water)    Return in 2 months for repeat HPV vaccination.  (April 7th)    Gastroesophageal Reflux Disease, Adult  Gastroesophageal reflux (DANIEL) happens when acid from the stomach flows up into the tube that connects the mouth and the stomach (esophagus). Normally, food travels down the esophagus and stays in the stomach to be digested. However, when a person has DANIEL, food and stomach acid sometimes move back up into the esophagus. If this becomes a more serious problem, the person may be diagnosed with a disease called gastroesophageal reflux disease (GERD). GERD occurs when the reflux:  · Happens often.  · Causes frequent or severe symptoms.  · Causes problems such as damage to the esophagus.  When stomach acid comes in contact with the esophagus, the acid may cause soreness (inflammation) in the esophagus. Over time, GERD may create small holes (ulcers) in the lining of the esophagus.  What are the causes?  This condition is caused by a problem with the muscle between the esophagus and the stomach (lower esophageal sphincter, or LES). Normally, the LES muscle closes after food passes through the esophagus to the stomach. When the LES is weakened or abnormal, it does not close properly, and that allows food and stomach acid to go back up into the esophagus.  The LES can be weakened by certain dietary substances, medicines, and medical conditions, including:  · Tobacco use.  · Pregnancy.  · Having a hiatal hernia.  · Alcohol use.  · Certain foods and beverages, such as coffee, chocolate, onions, and peppermint.  What increases the risk?  You are more likely to develop this condition if you:  · Have an increased body weight.  · Have a connective tissue disorder.  · Use NSAID medicines.  What are the signs or  symptoms?  Symptoms of this condition include:  · Heartburn.  · Difficult or painful swallowing.  · The feeling of having a lump in the throat.  · A bitter taste in the mouth.  · Bad breath.  · Having a large amount of saliva.  · Having an upset or bloated stomach.  · Belching.  · Chest pain. Different conditions can cause chest pain. Make sure you see your health care provider if you experience chest pain.  · Shortness of breath or wheezing.  · Ongoing (chronic) cough or a night-time cough.  · Wearing away of tooth enamel.  · Weight loss.  How is this diagnosed?  Your health care provider will take a medical history and perform a physical exam. To determine if you have mild or severe GERD, your health care provider may also monitor how you respond to treatment. You may also have tests, including:  · A test to examine your stomach and esophagus with a small camera (endoscopy).  · A test that measures the acidity level in your esophagus.  · A test that measures how much pressure is on your esophagus.  · A barium swallow or modified barium swallow test to show the shape, size, and functioning of your esophagus.  How is this treated?  The goal of treatment is to help relieve your symptoms and to prevent complications. Treatment for this condition may vary depending on how severe your symptoms are. Your health care provider may recommend:  · Changes to your diet.  · Medicine.  · Surgery.  Follow these instructions at home:  Eating and drinking    · Follow a diet as recommended by your health care provider. This may involve avoiding foods and drinks such as:  ? Coffee and tea (with or without caffeine).  ? Drinks that contain alcohol.  ? Energy drinks and sports drinks.  ? Carbonated drinks or sodas.  ? Chocolate and cocoa.  ? Peppermint and mint flavorings.  ? Garlic and onions.  ? Horseradish.  ? Spicy and acidic foods, including peppers, chili powder, rodas powder, vinegar, hot sauces, and barbecue sauce.  ? Citrus  fruit juices and citrus fruits, such as oranges, genevieve, and limes.  ? Tomato-based foods, such as red sauce, chili, salsa, and pizza with red sauce.  ? Fried and fatty foods, such as donuts, french fries, potato chips, and high-fat dressings.  ? High-fat meats, such as hot dogs and fatty cuts of red and white meats, such as rib eye steak, sausage, ham, and bundy.  ? High-fat dairy items, such as whole milk, butter, and cream cheese.  · Eat small, frequent meals instead of large meals.  · Avoid drinking large amounts of liquid with your meals.  · Avoid eating meals during the 2-3 hours before bedtime.  · Avoid lying down right after you eat.  · Do not exercise right after you eat.  Lifestyle    · Do not use any products that contain nicotine or tobacco, such as cigarettes, e-cigarettes, and chewing tobacco. If you need help quitting, ask your health care provider.  · Try to reduce your stress by using methods such as yoga or meditation. If you need help reducing stress, ask your health care provider.  · If you are overweight, reduce your weight to an amount that is healthy for you. Ask your health care provider for guidance about a safe weight loss goal.  General instructions  · Pay attention to any changes in your symptoms.  · Take over-the-counter and prescription medicines only as told by your health care provider. Do not take aspirin, ibuprofen, or other NSAIDs unless your health care provider told you to do so.  · Wear loose-fitting clothing. Do not wear anything tight around your waist that causes pressure on your abdomen.  · Raise (elevate) the head of your bed about 6 inches (15 cm).  · Avoid bending over if this makes your symptoms worse.  · Keep all follow-up visits as told by your health care provider. This is important.  Contact a health care provider if:  · You have:  ? New symptoms.  ? Unexplained weight loss.  ? Difficulty swallowing or it hurts to swallow.  ? Wheezing or a persistent cough.  ? A  hoarse voice.  · Your symptoms do not improve with treatment.  Get help right away if you:  · Have pain in your arms, neck, jaw, teeth, or back.  · Feel sweaty, dizzy, or light-headed.  · Have chest pain or shortness of breath.  · Vomit and your vomit looks like blood or coffee grounds.  · Faint.  · Have stool that is bloody or black.  · Cannot swallow, drink, or eat.  Summary  · Gastroesophageal reflux happens when acid from the stomach flows up into the esophagus. GERD is a disease in which the reflux happens often, causes frequent or severe symptoms, or causes problems such as damage to the esophagus.  · Treatment for this condition may vary depending on how severe your symptoms are. Your health care provider may recommend diet and lifestyle changes, medicine, or surgery.  · Contact a health care provider if you have new or worsening symptoms.  · Take over-the-counter and prescription medicines only as told by your health care provider. Do not take aspirin, ibuprofen, or other NSAIDs unless your health care provider told you to do so.  · Keep all follow-up visits as told by your health care provider. This is important.  This information is not intended to replace advice given to you by your health care provider. Make sure you discuss any questions you have with your health care provider.  Document Released: 09/27/2006 Document Revised: 06/26/2019 Document Reviewed: 06/26/2019  [employer] Patient Education © 2019 [employer]

## 2020-02-07 NOTE — PROGRESS NOTES
Chief Complaint   Patient presents with   • Establish Care       HPI     Camila Sena is a 40 y.o. female who presents to establish care.  Patient presents for management of possible asthma with cough and wheezing, seasonal allergies, GERD, HPV and chronic right shoulder pain.      Patient's main concern is her ongoing productive cough at night and in the morning.  Reports discolored sputum production.  Cough is present in some form during the Winter and Spring months.  She is taking cetirizine.  Has been to allergist with no significant findings.  Reports wheezing and episodic shortness of breath.  Uses albuterol inhaler prn and this relieves symptoms.  Denies any episodes where she can't get breath.  Using same inhaler for the last 2 years.  Not taking singulair.  Hasn't tried allegra.  Not using nasal sprays or saline at night.      She has chronic right shoulder pain and is currently followed by [facility].  She gets steroid injections episodically and this helps with her pain.      Patient is currently taking omeprazole 20 mg in the morning.  History of EGD showing focal erosive esophagitis.  Admits to eating late at night.  No symptoms with current medication.    Patient is established with gynecology and goes yearly for pap smear and exam.  She has not had HPV vaccination.  She is up-to-date with dentist and ophthalmologist.  No concerning skin lesions/moles.  She is up-to-date on mammogram.       Chief Complaint   Patient presents with   • Establish Care       Past Medical History:   Diagnosis Date   • Asthma    • GERD (gastroesophageal reflux disease)    • History of BCG vaccination    • History of cervical dysplasia     Resolved w/o any treatment       Past Surgical History:   Procedure Laterality Date   •  SECTION     • LAPAROTOMY EXPLORATORY FOR ECTOPIC PREGNANCY      Side unknown   • LAPAROTOMY EXPLORATORY FOR ECTOPIC PREGNANCY      Side unknown   • UPPER  GASTROINTESTINAL ENDOSCOPY  05/08/2018       Family History   Problem Relation Age of Onset   • Hypertension Mother    • Migraines Father    • Breast cancer Neg Hx    • Ovarian cancer Neg Hx    • Uterine cancer Neg Hx    • Endometrial cancer Neg Hx    • Colon cancer Neg Hx        Social History     Socioeconomic History   • Marital status: Significant Other     Spouse name: José Antonio   • Number of children: 1   • Years of education: H.S.   • Highest education level: High school graduate   Occupational History   • Occupation:      Employer: [employer] [place]   Tobacco Use   • Smoking status: Never Smoker   • Smokeless tobacco: Never Used   Substance and Sexual Activity   • Alcohol use: Yes     Frequency: Monthly or less   • Drug use: Never   • Sexual activity: Yes     Partners: Male     Birth control/protection: None       No Known Allergies    ROS    Review of Systems   Constitutional: Negative for activity change, appetite change, chills, diaphoresis, fatigue, fever, unexpected weight gain and unexpected weight loss.   HENT: Negative for congestion, dental problem, ear pain, hearing loss, nosebleeds, sinus pressure, sore throat and trouble swallowing.    Eyes: Positive for itching. Negative for blurred vision, pain, redness and visual disturbance.   Respiratory: Positive for cough, shortness of breath and wheezing. Negative for apnea and chest tightness.    Cardiovascular: Positive for leg swelling. Negative for chest pain and palpitations.   Gastrointestinal: Negative for abdominal distention, abdominal pain, anal bleeding, blood in stool, constipation, diarrhea, nausea, vomiting, GERD and indigestion.   Endocrine: Negative for cold intolerance, heat intolerance, polydipsia, polyphagia and polyuria.   Genitourinary: Negative for decreased urine volume, difficulty urinating, dysuria, frequency, hematuria, urgency and urinary incontinence.   Musculoskeletal: Positive for arthralgias. Negative  "for gait problem, joint swelling and bursitis.   Skin: Negative for dry skin, rash, skin lesions and bruise.   Neurological: Negative for dizziness, tremors, seizures, syncope, speech difficulty, weakness, light-headedness, headache, memory problem and confusion.   Hematological: Bruises/bleeds easily.   Psychiatric/Behavioral: Positive for sleep disturbance and stress. Negative for agitation, behavioral problems, decreased concentration, hallucinations, suicidal ideas and depressed mood. The patient is nervous/anxious.        Vitals:    02/07/20 0752   BP: 118/80   Pulse: 80   SpO2: 99%   Weight: 88 kg (194 lb)   Height: 167.6 cm (66\")     Body mass index is 31.31 kg/m².    Current Outpatient Medications on File Prior to Visit   Medication Sig Dispense Refill   • omeprazole (priLOSEC) 20 MG capsule Take 20 mg by mouth Daily.       No current facility-administered medications on file prior to visit.        Results for orders placed or performed in visit on 04/23/19   Comprehensive Metabolic Panel   Result Value Ref Range    Glucose 114 (H) 65 - 99 mg/dL    BUN 12 6 - 20 mg/dL    Creatinine 0.70 0.57 - 1.00 mg/dL    Sodium 140 136 - 145 mmol/L    Potassium 4.3 3.5 - 5.2 mmol/L    Chloride 100 98 - 107 mmol/L    CO2 28.2 22.0 - 29.0 mmol/L    Calcium 9.3 8.6 - 10.5 mg/dL    Total Protein 7.7 6.0 - 8.5 g/dL    Albumin 4.30 3.50 - 5.20 g/dL    ALT (SGPT) 19 1 - 33 U/L    AST (SGOT) 17 1 - 32 U/L    Alkaline Phosphatase 63 39 - 117 U/L    Total Bilirubin 0.2 0.2 - 1.2 mg/dL    eGFR Non African Amer 92 >60 mL/min/1.73    eGFR  African Amer 112 >60 mL/min/1.73    Globulin 3.4 gm/dL    A/G Ratio 1.3 g/dL    BUN/Creatinine Ratio 17.1 7.0 - 25.0    Anion Gap 11.8 mmol/L   Lipid Panel   Result Value Ref Range    Total Cholesterol 181 0 - 200 mg/dL    Triglycerides 86 0 - 150 mg/dL    HDL Cholesterol 56 40 - 60 mg/dL    LDL Cholesterol  108 (H) 0 - 100 mg/dL    VLDL Cholesterol 17.2 5 - 40 mg/dL    LDL/HDL Ratio 1.93    TSH "   Result Value Ref Range    TSH 4.400 (H) 0.270 - 4.200 mIU/mL   Uric Acid   Result Value Ref Range    Uric Acid 3.5 2.4 - 5.7 mg/dL   C-reactive Protein   Result Value Ref Range    C-Reactive Protein 0.92 (H) 0.00 - 0.50 mg/dL   HIV-1 / O / 2 Ag / Antibody 4th Generation   Result Value Ref Range    HIV-1/ HIV-2 Non-Reactive Non-Reactive   Hemoglobin A1c   Result Value Ref Range    Hemoglobin A1C 5.10 4.80 - 5.60 %   CBC Auto Differential   Result Value Ref Range    WBC 5.99 3.40 - 10.80 10*3/mm3    RBC 4.02 3.77 - 5.28 10*6/mm3    Hemoglobin 11.6 (L) 12.0 - 15.9 g/dL    Hematocrit 37.0 34.0 - 46.6 %    MCV 92.0 79.0 - 97.0 fL    MCH 28.9 26.6 - 33.0 pg    MCHC 31.4 (L) 31.5 - 35.7 g/dL    RDW 13.2 12.3 - 15.4 %    RDW-SD 45.1 37.0 - 54.0 fl    MPV 10.1 6.0 - 12.0 fL    Platelets 263 140 - 450 10*3/mm3    Neutrophil % 57.3 42.7 - 76.0 %    Lymphocyte % 33.4 19.6 - 45.3 %    Monocyte % 6.3 5.0 - 12.0 %    Eosinophil % 2.3 0.3 - 6.2 %    Basophil % 0.5 0.0 - 1.5 %    Immature Grans % 0.2 0.0 - 0.5 %    Neutrophils, Absolute 3.43 1.70 - 7.00 10*3/mm3    Lymphocytes, Absolute 2.00 0.70 - 3.10 10*3/mm3    Monocytes, Absolute 0.38 0.10 - 0.90 10*3/mm3    Eosinophils, Absolute 0.14 0.00 - 0.40 10*3/mm3    Basophils, Absolute 0.03 0.00 - 0.20 10*3/mm3    Immature Grans, Absolute 0.01 0.00 - 0.05 10*3/mm3    nRBC 0.0 0.0 - 0.2 /100 WBC   POC Urinalysis Dipstick, Automated   Result Value Ref Range    Color Yellow Yellow, Straw, Dark Yellow, Brit    Clarity, UA Clear Clear    Specific Gravity  1.010 1.005 - 1.030    pH, Urine 6.0 5.0 - 8.0    Leukocytes Negative Negative    Nitrite, UA Negative Negative    Protein, POC Negative Negative mg/dL    Glucose, UA Negative Negative, 1000 mg/dL (3+) mg/dL    Ketones, UA Negative Negative    Urobilinogen, UA Normal Normal    Bilirubin Negative Negative    Blood, UA Small (A) Negative       PE    Physical Exam   Constitutional: She is oriented to person, place, and time. Vital signs  are normal. She appears well-developed and well-nourished. She is active and cooperative. She does not appear ill. No distress. She appears overweight. She is not obese.  HENT:   Head: Normocephalic and atraumatic.   Right Ear: Hearing, tympanic membrane, external ear and ear canal normal.   Left Ear: Hearing, tympanic membrane, external ear and ear canal normal.   Nose: Mucosal edema and rhinorrhea present. Right sinus exhibits no maxillary sinus tenderness and no frontal sinus tenderness. Left sinus exhibits no maxillary sinus tenderness and no frontal sinus tenderness.   Mouth/Throat: Uvula is midline, oropharynx is clear and moist and mucous membranes are normal.   Eyes: Conjunctivae, EOM and lids are normal.   Neck: Trachea normal, normal range of motion and phonation normal. No thyroid mass and no thyromegaly present.   Cardiovascular: Normal rate, regular rhythm and normal heart sounds.   Pulmonary/Chest: Effort normal and breath sounds normal.   Abdominal: Soft. Normal appearance and bowel sounds are normal. She exhibits no distension. There is no tenderness. There is no rigidity, no guarding and no CVA tenderness.   Musculoskeletal: Normal range of motion. She exhibits no edema.        Arms:  Lymphadenopathy:     She has no cervical adenopathy.        Right cervical: No superficial cervical adenopathy present.       Left cervical: No superficial cervical adenopathy present.   Neurological: She is alert and oriented to person, place, and time. She has normal strength and normal reflexes. Coordination and gait normal.   CN grossly intact   Skin: Skin is warm and intact. No rash noted. She is not diaphoretic. No cyanosis or erythema. Nails show no clubbing.   Psychiatric: She has a normal mood and affect. Her speech is normal and behavior is normal. Judgment and thought content normal. She is not actively hallucinating. Cognition and memory are normal. She is attentive.   Vitals reviewed.      A/P    Camila was  seen today for establish care.    Diagnoses and all orders for this visit:    Encounter to establish care    Physical exam, annual  -     CBC Auto Differential  -     Comprehensive Metabolic Panel  -     TSH Rfx On Abnormal To Free T4  -     Lipid Panel  -     QuantiFERON TB Plus Client Incubated  PE is unremarkable  Preventative labs ordered  Dentist and Ophthalmologist are up-to-date  Mammogram is up-to-date  Followed by gynecology, yearly  Dermatologist - no concerning skin lesions/moles  Flu vaccine completed, needs HPV vaccine    Vitamin D deficiency  -     Vitamin D 25 Hydroxy  Taking multi-vitamin, not separate vitamin D supplement.    Chronic right shoulder pain  Followed by [facility].  Gets injections episodically and this helps with pain.  Focal TTP along bicep tendon insertion    Need for HPV vaccine  -     HPV Vaccine QuadriValent 3 Dose IM  Patient has HPV 16, has not had HPV vaccine and will administer  Followed by gynecology, gets yearly pap smears    History of BCG vaccination  -     QuantiFERON TB Plus Client Incubated    Seasonal allergies  -     fexofenadine (ALLEGRA) 180 MG tablet; Take 1 tablet by mouth Daily.  -     montelukast (SINGULAIR) 10 MG tablet; Take 1 tablet by mouth Every Night.  Reports cough worse at night and in the morning, usually worse in the Winter and Spring.  Most likely related to allergies and post-nasal drip cough.  Has been to allergist in the past with no significant findings.  Taking zyrtec currently.  No nasal sprays or singulair.  Trial nasal saline at night.  Stop zyrtec and start allegra.  Add singulair.  Follow-up in 1 month.    Mild intermittent asthma without complication  -     albuterol sulfate  (90 Base) MCG/ACT inhaler; Inhale 2 puffs Every 4 (Four) Hours As Needed for Wheezing or Shortness of Air.  Most likely related to allergies.  No episodes of SOB where she is unable to get air.  Has been using same albuterol inhaler for the last 2  years.  Will send in new prescription.    Gastroesophageal reflux disease with esophagitis  EGD showed focal erosive esophagitis.  Taking omeprazole 20 mg in the morning.  Discussed avoiding eating late at night, handout given.    Acute bronchitis, unspecified organism  -     azithromycin (ZITHROMAX Z-MATT) 250 MG tablet; Take 2 tablets the first day, then 1 tablet daily for 4 days.  With ongoing cough and productive discolored sputum.  Will cover with azithromycin.       Plan of care reviewed with patient at the conclusion of today's visit. Education was provided regarding diagnosis, management and any prescribed or recommended OTC medications.  Patient verbalizes understanding of and agreement with management plan.    Return in about 4 weeks (around 3/6/2020) for Recheck, allergies.     Zaida De Jesus PA-C

## 2020-02-11 LAB
QUANTIFERON CRITERIA: NORMAL
QUANTIFERON MITOGEN VALUE: >10 IU/ML
QUANTIFERON NIL VALUE: 0.02 IU/ML
QUANTIFERON TB1 AG VALUE: 0.04 IU/ML
QUANTIFERON TB2 AG VALUE: 0.03 IU/ML
QUANTIFERON-TB GOLD PLUS: NEGATIVE

## 2020-02-14 ENCOUNTER — TELEPHONE (OUTPATIENT)
Dept: FAMILY MEDICINE CLINIC | Facility: CLINIC | Age: 40
End: 2020-02-14

## 2020-02-14 NOTE — TELEPHONE ENCOUNTER
Patient called and requested a copy of her physical with her TB skin test to be ready for her when she brings her daughter to her appointment in office on 02/17/2020. She said she needs it for work. She would like a phone call so she knows they are ready for . Please advise.    Patient Callback # [phone]

## 2020-02-21 ENCOUNTER — CLINICAL SUPPORT (OUTPATIENT)
Dept: FAMILY MEDICINE CLINIC | Facility: CLINIC | Age: 40
End: 2020-02-21

## 2020-02-21 DIAGNOSIS — Z23 NEEDS FLU SHOT: Primary | ICD-10-CM

## 2020-02-21 PROCEDURE — 90471 IMMUNIZATION ADMIN: CPT | Performed by: PHYSICIAN ASSISTANT

## 2020-02-21 PROCEDURE — 90686 IIV4 VACC NO PRSV 0.5 ML IM: CPT | Performed by: PHYSICIAN ASSISTANT

## 2020-03-06 ENCOUNTER — OFFICE VISIT (OUTPATIENT)
Dept: FAMILY MEDICINE CLINIC | Facility: CLINIC | Age: 40
End: 2020-03-06

## 2020-03-06 VITALS
WEIGHT: 189.8 LBS | HEART RATE: 83 BPM | SYSTOLIC BLOOD PRESSURE: 118 MMHG | HEIGHT: 66 IN | DIASTOLIC BLOOD PRESSURE: 80 MMHG | BODY MASS INDEX: 30.5 KG/M2 | OXYGEN SATURATION: 98 %

## 2020-03-06 DIAGNOSIS — Z23 NEED FOR HPV VACCINATION: ICD-10-CM

## 2020-03-06 DIAGNOSIS — E55.9 VITAMIN D DEFICIENCY: ICD-10-CM

## 2020-03-06 DIAGNOSIS — F41.9 ANXIETY: ICD-10-CM

## 2020-03-06 DIAGNOSIS — J30.2 SEASONAL ALLERGIES: Primary | ICD-10-CM

## 2020-03-06 DIAGNOSIS — J45.20 MILD INTERMITTENT ASTHMA WITHOUT COMPLICATION: ICD-10-CM

## 2020-03-06 PROCEDURE — 99214 OFFICE O/P EST MOD 30 MIN: CPT | Performed by: PHYSICIAN ASSISTANT

## 2020-03-06 RX ORDER — ESCITALOPRAM OXALATE 10 MG/1
10 TABLET ORAL DAILY
Qty: 30 TABLET | Refills: 5 | Status: SHIPPED | OUTPATIENT
Start: 2020-03-06 | End: 2020-07-23

## 2020-03-06 RX ORDER — FEXOFENADINE HCL 180 MG/1
180 TABLET ORAL DAILY
Qty: 30 TABLET | Refills: 5 | Status: SHIPPED | OUTPATIENT
Start: 2020-03-06 | End: 2020-07-23

## 2020-03-06 RX ORDER — MONTELUKAST SODIUM 10 MG/1
10 TABLET ORAL NIGHTLY
Qty: 30 TABLET | Refills: 5 | Status: SHIPPED | OUTPATIENT
Start: 2020-03-06 | End: 2020-07-23

## 2020-03-06 NOTE — PROGRESS NOTES
Chief Complaint   Patient presents with   • Follow-up       HPI     Camila Sena is a pleasant 40 y.o. female who is here for routine follow-up of asthma and seasonal allergies.  Patient reports significant improvement in her breathing and cough with new medications.  She has only used her albuterol inhaler 4 times in the last month.      She reports worsening stress and anxiety.  Has overwhelmed feeling daily.  Would be interested in trying medicine.  Reports that her new job is very stressful.  Her family has noticed her increased stress.  Having trouble sleeping for the last two weeks.    Past Medical History:   Diagnosis Date   • Asthma    • GERD (gastroesophageal reflux disease)    • History of BCG vaccination    • History of cervical dysplasia     Resolved w/o any treatment       Past Surgical History:   Procedure Laterality Date   •  SECTION     • LAPAROTOMY EXPLORATORY FOR ECTOPIC PREGNANCY      Side unknown   • LAPAROTOMY EXPLORATORY FOR ECTOPIC PREGNANCY      Side unknown   • UPPER GASTROINTESTINAL ENDOSCOPY  2018       Family History   Problem Relation Age of Onset   • Hypertension Mother    • Migraines Father    • Breast cancer Neg Hx    • Ovarian cancer Neg Hx    • Uterine cancer Neg Hx    • Endometrial cancer Neg Hx    • Colon cancer Neg Hx        Social History     Socioeconomic History   • Marital status: Significant Other     Spouse name: José Antonio   • Number of children: 1   • Years of education: H.S.   • Highest education level: High school graduate   Occupational History   • Occupation:      Employer: [employer] [place]   Tobacco Use   • Smoking status: Never Smoker   • Smokeless tobacco: Never Used   Substance and Sexual Activity   • Alcohol use: Yes     Frequency: Monthly or less   • Drug use: Never   • Sexual activity: Yes     Partners: Male     Birth control/protection: None       No Known Allergies    ROS  Review of Systems  "  Constitutional: Negative for fever.   HENT: Negative for ear pain, rhinorrhea, sore throat and swollen glands.    Respiratory: Negative for shortness of breath and wheezing.    Cardiovascular: Negative for chest pain and leg swelling.   Gastrointestinal: Negative for abdominal pain and vomiting.   Musculoskeletal: Negative for neck pain.   Skin: Negative for rash.       Vitals:    03/06/20 0742   BP: 118/80   Pulse: 83   SpO2: 98%   Weight: 86.1 kg (189 lb 12.8 oz)   Height: 167.6 cm (66\")     Body mass index is 30.63 kg/m².    Current Outpatient Medications on File Prior to Visit   Medication Sig Dispense Refill   • albuterol sulfate  (90 Base) MCG/ACT inhaler Inhale 2 puffs Every 4 (Four) Hours As Needed for Wheezing or Shortness of Air. 1 inhaler 5   • azithromycin (ZITHROMAX Z-MATT) 250 MG tablet Take 2 tablets the first day, then 1 tablet daily for 4 days. 6 tablet 0   • omeprazole (priLOSEC) 20 MG capsule Take 20 mg by mouth Daily.     • [DISCONTINUED] fexofenadine (ALLEGRA) 180 MG tablet Take 1 tablet by mouth Daily. 30 tablet 1   • [DISCONTINUED] montelukast (SINGULAIR) 10 MG tablet Take 1 tablet by mouth Every Night. 30 tablet 1     No current facility-administered medications on file prior to visit.        Results for orders placed or performed in visit on 02/07/20   CBC Auto Differential   Result Value Ref Range    WBC 5.86 3.40 - 10.80 10*3/mm3    RBC 4.75 3.77 - 5.28 10*6/mm3    Hemoglobin 13.9 12.0 - 15.9 g/dL    Hematocrit 41.3 34.0 - 46.6 %    MCV 86.9 79.0 - 97.0 fL    MCH 29.3 26.6 - 33.0 pg    MCHC 33.7 31.5 - 35.7 g/dL    RDW 13.0 12.3 - 15.4 %    RDW-SD 40.6 37.0 - 54.0 fl    MPV 10.0 6.0 - 12.0 fL    Platelets 242 140 - 450 10*3/mm3    Neutrophil % 52.3 42.7 - 76.0 %    Lymphocyte % 34.8 19.6 - 45.3 %    Monocyte % 7.2 5.0 - 12.0 %    Eosinophil % 4.8 0.3 - 6.2 %    Basophil % 0.7 0.0 - 1.5 %    Immature Grans % 0.2 0.0 - 0.5 %    Neutrophils, Absolute 3.07 1.70 - 7.00 10*3/mm3    " Lymphocytes, Absolute 2.04 0.70 - 3.10 10*3/mm3    Monocytes, Absolute 0.42 0.10 - 0.90 10*3/mm3    Eosinophils, Absolute 0.28 0.00 - 0.40 10*3/mm3    Basophils, Absolute 0.04 0.00 - 0.20 10*3/mm3    Immature Grans, Absolute 0.01 0.00 - 0.05 10*3/mm3    nRBC 0.0 0.0 - 0.2 /100 WBC   Comprehensive Metabolic Panel   Result Value Ref Range    Glucose 97 65 - 99 mg/dL    BUN 14 6 - 20 mg/dL    Creatinine 0.64 0.57 - 1.00 mg/dL    Sodium 139 136 - 145 mmol/L    Potassium 4.4 3.5 - 5.2 mmol/L    Chloride 102 98 - 107 mmol/L    CO2 24.4 22.0 - 29.0 mmol/L    Calcium 9.3 8.6 - 10.5 mg/dL    Total Protein 7.5 6.0 - 8.5 g/dL    Albumin 4.40 3.50 - 5.20 g/dL    ALT (SGPT) 99 (H) 1 - 33 U/L    AST (SGOT) 35 (H) 1 - 32 U/L    Alkaline Phosphatase 74 39 - 117 U/L    Total Bilirubin 0.2 0.2 - 1.2 mg/dL    eGFR Non African Amer 102 >60 mL/min/1.73    eGFR  African Amer 123 >60 mL/min/1.73    Globulin 3.1 gm/dL    A/G Ratio 1.4 g/dL    BUN/Creatinine Ratio 21.9 7.0 - 25.0    Anion Gap 12.6 5.0 - 15.0 mmol/L   TSH Rfx On Abnormal To Free T4   Result Value Ref Range    TSH 3.880 0.270 - 4.200 uIU/mL   Lipid Panel   Result Value Ref Range    Total Cholesterol 222 (H) 0 - 200 mg/dL    Triglycerides 52 0 - 150 mg/dL    HDL Cholesterol 72 (H) 40 - 60 mg/dL    LDL Cholesterol  140 (H) 0 - 100 mg/dL    VLDL Cholesterol 10.4 5 - 40 mg/dL    LDL/HDL Ratio 1.94    Vitamin D 25 Hydroxy   Result Value Ref Range    25 Hydroxy, Vitamin D 27.4 (L) 30.0 - 100.0 ng/ml   QuantiFERON TB Plus Client Incubated   Result Value Ref Range    QuantiFERON Criteria Comment     QUANTIFERON TB1 AG VALUE 0.04 IU/mL    QUANTIFERON TB2 AG VALUE 0.03 IU/mL    QuantiFERON Nil Value 0.02 IU/mL    QuantiFERON Mitogen Value >10.00 IU/mL    QUANTIFERON-TB GOLD PLUS Negative Negative       PE    Physical Exam   Constitutional: She appears well-developed and well-nourished. She is active and cooperative. No distress.   HENT:   Head: Normocephalic and atraumatic.   Eyes: EOM  are normal.   Neck: Normal range of motion.   Cardiovascular: Normal rate, regular rhythm and normal heart sounds.   Pulmonary/Chest: Effort normal and breath sounds normal.   Musculoskeletal: Normal range of motion. She exhibits no edema.   Neurological: She is alert.   Skin: Skin is warm. She is not diaphoretic. No erythema.   Psychiatric: She has a normal mood and affect. Her speech is normal and behavior is normal. Judgment and thought content normal. She is not actively hallucinating. She is attentive.   Vitals reviewed.      A/P    Camila was seen today for follow-up.    Diagnoses and all orders for this visit:    Seasonal allergies  -     fexofenadine (ALLEGRA) 180 MG tablet; Take 1 tablet by mouth Daily.  -     montelukast (SINGULAIR) 10 MG tablet; Take 1 tablet by mouth Every Night.  Improved with current medication regiment.    Mild intermittent asthma without complication  Improved with recent treatment of allergies.  Has only had to use albuterol inhaler 4 times in the last month.    Anxiety  -     escitalopram (LEXAPRO) 10 MG tablet; Take 1 tablet by mouth Daily.  Reports recent stress and anxiety with work.  Affecting her daily, having trouble sleeping.  Willing to try medicine, will send in lexapro.    Need for HPV vaccination  -     HPV Vaccine QuadriValent 3 Dose IM  Return in 1 month for second vaccination.             Plan of care reviewed with patient at the conclusion of today's visit. Education was provided regarding diagnosis, management and any prescribed or recommended OTC medications.  Patient verbalizes understanding of and agreement with management plan.    Return in about 5 months (around 8/6/2020) for Recheck, 6 month.     Zaida De Jesus PA-C

## 2020-04-13 ENCOUNTER — TELEPHONE (OUTPATIENT)
Dept: FAMILY MEDICINE CLINIC | Facility: CLINIC | Age: 40
End: 2020-04-13

## 2020-04-13 NOTE — TELEPHONE ENCOUNTER
Contacted patient, she states that she had asthma so she always wears a mask and gloves at work, several of her coworkers have pending COVID labs. I advised her to remain home and self quarantine for the next 14 days. I will mail a letter to home address and I provided her with the Health Dept's number.

## 2020-04-13 NOTE — TELEPHONE ENCOUNTER
Patient requested a call back. Patient stated she was exposed to people who have tested positive for COVID -19 at work. Patient stated she currently has no symptoms but is very concerned. Patient would like to know if she needs to be tested for COVID -19  and where she can go to get tested.    Please call and advise. Patient call back [phone]

## 2020-06-04 ENCOUNTER — OFFICE VISIT (OUTPATIENT)
Dept: OBSTETRICS AND GYNECOLOGY | Facility: CLINIC | Age: 40
End: 2020-06-04

## 2020-06-04 VITALS
WEIGHT: 184.2 LBS | DIASTOLIC BLOOD PRESSURE: 82 MMHG | HEIGHT: 66 IN | BODY MASS INDEX: 29.6 KG/M2 | SYSTOLIC BLOOD PRESSURE: 128 MMHG

## 2020-06-04 DIAGNOSIS — R87.810 CERVICAL HIGH RISK HUMAN PAPILLOMAVIRUS (HPV) DNA TEST POSITIVE: ICD-10-CM

## 2020-06-04 DIAGNOSIS — Z01.419 ENCNTR FOR GYN EXAM (GENERAL) (ROUTINE) W/O ABN FINDINGS: Primary | ICD-10-CM

## 2020-06-04 PROCEDURE — 99396 PREV VISIT EST AGE 40-64: CPT | Performed by: OBSTETRICS & GYNECOLOGY

## 2020-06-04 RX ORDER — MELOXICAM 15 MG/1
TABLET ORAL
COMMUNITY
Start: 2020-04-13 | End: 2020-07-23

## 2020-06-04 RX ORDER — TRAMADOL HYDROCHLORIDE 50 MG/1
TABLET ORAL
COMMUNITY
Start: 2020-04-13 | End: 2020-07-23

## 2020-06-04 NOTE — PROGRESS NOTES
Subjective   Chief Complaint   Patient presents with   • Gynecologic Exam     annual; no c/o     Camila Sena is a 40 y.o. year old  presenting to be seen for her annual exam.     SEXUAL Hx:  She is currently sexually active.  In the past year there there has been NO new sexual partners.    Condoms are never used.  She would not like to be screened for STD's at today's exam.  Current birth control method: not using any form of contraception.  She is not trying to conceive but would be OK if she did get pregnant.  She is happy with her current method of contraception and does not want to discuss alternative methods of contraception.  MENSTRUAL Hx:  Patient's last menstrual period was 2020 (exact date).  In the past 6 months her cycles have been regular, predictable and occur monthly.  Her menstrual flow is typically normal.   Each month on average there are roughly 0 day(s) of very heavy flow.    Intermenstrual bleeding is absent.    Post-coital bleeding is absent.  Dysmenorrhea: mild and is not affecting her activities of daily living  PMS: mild and is not affecting her activities of daily living  Her cycles are not a source of concern for her that she wishes to discuss today.  HEALTH Hx:  She exercises regularly: no (and has no plans to become more active).  She wears her seat belt: yes.  She has concerns about domestic violence: no.  OTHER THINGS SHE WANTS TO DISCUSS TODAY:  Nothing else    The following portions of the patient's history were reviewed and updated as appropriate:problem list, current medications, allergies, past family history, past medical history, past social history and past surgical history.    Social History    Tobacco Use      Smoking status: Never Smoker      Smokeless tobacco: Never Used    Review of Systems  Constitutional POS: nothing reported    NEG: anorexia or night sweats   Genitourinary POS: nothing reported    NEG: dysuria or hematuria      Gastointestinal POS:  "nothing reported    NEG: bloating, change in bowel habits, melena or reflux symptoms   Integument POS: nothing reported    NEG: moles that are changing in size, shape, color or rashes   Breast POS: nothing reported    NEG: persistent breast lump, skin dimpling or nipple discharge        Objective   /82   Ht 167.6 cm (66\")   Wt 83.6 kg (184 lb 3.2 oz)   LMP 05/19/2020 (Exact Date)   Breastfeeding No   BMI 29.73 kg/m²     General:  well developed; well nourished  no acute distress   Skin:  No suspicious lesions seen   Thyroid: normal to inspection and palpation   Breasts:  Examined in supine position  Symmetric without masses or skin dimpling  Nipples normal without inversion, lesions or discharge  There are no palpable axillary nodes   Abdomen: soft, non-tender; no masses  no umbilical or inguinal hernias are present  no hepato-splenomegaly   Pelvis: Clinical staff was present for exam  External genitalia:  normal appearance of the external genitalia including Bartholin's and Texline's glands.  :  urethral meatus normal;  Vaginal:  normal pink mucosa without prolapse or lesions.  Cervix:  normal appearance.  Uterus:  normal size, shape and consistency.  Adnexa:  normal bimanual exam of the adnexa.        Assessment   1. Normal GYN exam  2. 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  If she does not receive the results of the Pap within 2 weeks  time, she was instructed to call to find out the results.  I explained to Camila that the recommendations for Pap smear interval in a low risk patient's has lengthened to 5 years time if both cytology and HPV testing were normal.  I encouraged her to be seen yearly for a full physical exam including breast and pelvic exam even during the off years when PAP's will not be performed.  She was encouraged to get yearly mammograms.  She should report any palpable breast lump(s) or skin changes regardless of mammographic " findings.  I explained to Camila that notification regarding her mammogram results will come from the center performing the study.  Our office will not be routinely calling with mammogram results.  It is her responsibility to make sure that the results from the mammogram are communicated to her by the breast center.  If she has any questions about the results, she is welcome to call our office anytime.  1. No prescription was given or electronically sent at today's visit  2. The importance of keeping all planned follow-up and taking all medications as prescribed was emphasized.  3. Follow up for annual exam in 1 year    No orders of the defined types were placed in this encounter.         This note was electronically signed.    Lucy Hawley, DO  June 4, 2020    Note: Speech recognition transcription software may have been used to create portions of this document.  An attempt at proofreading has been made but errors in transcription could still be present.

## 2020-07-23 ENCOUNTER — OFFICE VISIT (OUTPATIENT)
Dept: OBSTETRICS AND GYNECOLOGY | Facility: CLINIC | Age: 40
End: 2020-07-23

## 2020-07-23 VITALS
DIASTOLIC BLOOD PRESSURE: 92 MMHG | HEIGHT: 66 IN | SYSTOLIC BLOOD PRESSURE: 138 MMHG | WEIGHT: 192.8 LBS | BODY MASS INDEX: 30.98 KG/M2

## 2020-07-23 DIAGNOSIS — R87.810 CERVICAL HIGH RISK HUMAN PAPILLOMAVIRUS (HPV) DNA TEST POSITIVE: Primary | ICD-10-CM

## 2020-07-23 PROCEDURE — 57454 BX/CURETT OF CERVIX W/SCOPE: CPT | Performed by: OBSTETRICS & GYNECOLOGY

## 2020-07-23 RX ORDER — MONTELUKAST SODIUM 10 MG/1
10 TABLET ORAL NIGHTLY
COMMUNITY
End: 2020-09-01 | Stop reason: SDUPTHER

## 2020-07-23 RX ORDER — FEXOFENADINE HCL 180 MG/1
180 TABLET ORAL DAILY
COMMUNITY
End: 2020-09-01 | Stop reason: SDUPTHER

## 2020-08-03 ENCOUNTER — TELEPHONE (OUTPATIENT)
Dept: OBSTETRICS AND GYNECOLOGY | Facility: CLINIC | Age: 40
End: 2020-08-03

## 2020-08-04 ENCOUNTER — TELEPHONE (OUTPATIENT)
Dept: FAMILY MEDICINE CLINIC | Facility: CLINIC | Age: 40
End: 2020-08-04

## 2020-08-04 NOTE — TELEPHONE ENCOUNTER
Informed the patient of the website to check and see where they are offering tests and get alok dup from there. She verbalized a good understanding

## 2020-08-04 NOTE — TELEPHONE ENCOUNTER
Caller: Camila Sena    Relationship to patient: Self    Best call back number: [phone]     Concerns or Questions if Applicable: PT STATED THAT A FRIEND WHO SHE WAS WITH ON Saturday TESTED POSITIVE FOR COVID AND SHE WOULD LIKE TO KNOW WHERE SHE CAN GO TO GET TESTED.    Travel screen questions: N/A

## 2020-09-01 ENCOUNTER — OFFICE VISIT (OUTPATIENT)
Dept: FAMILY MEDICINE CLINIC | Facility: CLINIC | Age: 40
End: 2020-09-01

## 2020-09-01 ENCOUNTER — LAB (OUTPATIENT)
Dept: LAB | Facility: HOSPITAL | Age: 40
End: 2020-09-01

## 2020-09-01 VITALS
HEIGHT: 66 IN | HEART RATE: 86 BPM | WEIGHT: 198.4 LBS | OXYGEN SATURATION: 98 % | BODY MASS INDEX: 31.88 KG/M2 | SYSTOLIC BLOOD PRESSURE: 128 MMHG | DIASTOLIC BLOOD PRESSURE: 92 MMHG

## 2020-09-01 DIAGNOSIS — R79.89 ABNORMAL THYROID BLOOD TEST: ICD-10-CM

## 2020-09-01 DIAGNOSIS — K21.00 GASTROESOPHAGEAL REFLUX DISEASE WITH ESOPHAGITIS: ICD-10-CM

## 2020-09-01 DIAGNOSIS — R74.8 ELEVATED LIVER ENZYMES: ICD-10-CM

## 2020-09-01 DIAGNOSIS — M25.511 CHRONIC RIGHT SHOULDER PAIN: ICD-10-CM

## 2020-09-01 DIAGNOSIS — Z11.59 ENCOUNTER FOR HEPATITIS C SCREENING TEST FOR LOW RISK PATIENT: ICD-10-CM

## 2020-09-01 DIAGNOSIS — I10 ESSENTIAL HYPERTENSION: ICD-10-CM

## 2020-09-01 DIAGNOSIS — G89.29 CHRONIC RIGHT SHOULDER PAIN: ICD-10-CM

## 2020-09-01 DIAGNOSIS — Z23 NEED FOR HPV VACCINATION: ICD-10-CM

## 2020-09-01 DIAGNOSIS — J30.2 SEASONAL ALLERGIES: ICD-10-CM

## 2020-09-01 DIAGNOSIS — Z23 NEED FOR INFLUENZA VACCINATION: ICD-10-CM

## 2020-09-01 DIAGNOSIS — F41.9 ANXIETY: Primary | ICD-10-CM

## 2020-09-01 DIAGNOSIS — R87.810 CERVICAL HIGH RISK HUMAN PAPILLOMAVIRUS (HPV) DNA TEST POSITIVE: ICD-10-CM

## 2020-09-01 LAB
ALBUMIN SERPL-MCNC: 4.4 G/DL (ref 3.5–5.2)
ALBUMIN/GLOB SERPL: 1.3 G/DL
ALP SERPL-CCNC: 74 U/L (ref 39–117)
ALT SERPL W P-5'-P-CCNC: 31 U/L (ref 1–33)
ANION GAP SERPL CALCULATED.3IONS-SCNC: 9.1 MMOL/L (ref 5–15)
AST SERPL-CCNC: 26 U/L (ref 1–32)
BILIRUB SERPL-MCNC: 0.3 MG/DL (ref 0–1.2)
BUN SERPL-MCNC: 14 MG/DL (ref 6–20)
BUN/CREAT SERPL: 21.5 (ref 7–25)
CALCIUM SPEC-SCNC: 9.5 MG/DL (ref 8.6–10.5)
CHLORIDE SERPL-SCNC: 100 MMOL/L (ref 98–107)
CO2 SERPL-SCNC: 25.9 MMOL/L (ref 22–29)
CREAT SERPL-MCNC: 0.65 MG/DL (ref 0.57–1)
GFR SERPL CREATININE-BSD FRML MDRD: 100 ML/MIN/1.73
GFR SERPL CREATININE-BSD FRML MDRD: 121 ML/MIN/1.73
GLOBULIN UR ELPH-MCNC: 3.3 GM/DL
GLUCOSE SERPL-MCNC: 92 MG/DL (ref 65–99)
HCV AB SER DONR QL: NORMAL
POTASSIUM SERPL-SCNC: 4.1 MMOL/L (ref 3.5–5.2)
PROT SERPL-MCNC: 7.7 G/DL (ref 6–8.5)
SODIUM SERPL-SCNC: 135 MMOL/L (ref 136–145)
TSH SERPL DL<=0.05 MIU/L-ACNC: 3.13 UIU/ML (ref 0.27–4.2)

## 2020-09-01 PROCEDURE — 90471 IMMUNIZATION ADMIN: CPT | Performed by: PHYSICIAN ASSISTANT

## 2020-09-01 PROCEDURE — 84443 ASSAY THYROID STIM HORMONE: CPT | Performed by: PHYSICIAN ASSISTANT

## 2020-09-01 PROCEDURE — 90649 4VHPV VACCINE 3 DOSE IM: CPT | Performed by: PHYSICIAN ASSISTANT

## 2020-09-01 PROCEDURE — 99214 OFFICE O/P EST MOD 30 MIN: CPT | Performed by: PHYSICIAN ASSISTANT

## 2020-09-01 PROCEDURE — 36415 COLL VENOUS BLD VENIPUNCTURE: CPT | Performed by: PHYSICIAN ASSISTANT

## 2020-09-01 PROCEDURE — 80053 COMPREHEN METABOLIC PANEL: CPT | Performed by: PHYSICIAN ASSISTANT

## 2020-09-01 PROCEDURE — 90686 IIV4 VACC NO PRSV 0.5 ML IM: CPT | Performed by: PHYSICIAN ASSISTANT

## 2020-09-01 PROCEDURE — 90472 IMMUNIZATION ADMIN EACH ADD: CPT | Performed by: PHYSICIAN ASSISTANT

## 2020-09-01 PROCEDURE — 86803 HEPATITIS C AB TEST: CPT | Performed by: PHYSICIAN ASSISTANT

## 2020-09-01 RX ORDER — MONTELUKAST SODIUM 10 MG/1
10 TABLET ORAL NIGHTLY
Qty: 90 TABLET | Refills: 1 | Status: SHIPPED | OUTPATIENT
Start: 2020-09-01 | End: 2021-03-31

## 2020-09-01 RX ORDER — FEXOFENADINE HCL 180 MG/1
180 TABLET ORAL DAILY
Qty: 90 TABLET | Refills: 1 | Status: SHIPPED | OUTPATIENT
Start: 2020-09-01 | End: 2021-05-03

## 2020-09-01 RX ORDER — ESCITALOPRAM OXALATE 20 MG/1
20 TABLET ORAL EVERY MORNING
Qty: 30 TABLET | Refills: 1 | Status: SHIPPED | OUTPATIENT
Start: 2020-09-01 | End: 2020-11-03 | Stop reason: SDUPTHER

## 2020-09-01 RX ORDER — AMLODIPINE BESYLATE 5 MG/1
5 TABLET ORAL DAILY
Qty: 30 TABLET | Refills: 1 | Status: SHIPPED | OUTPATIENT
Start: 2020-09-01 | End: 2020-11-03 | Stop reason: SDUPTHER

## 2020-09-01 RX ORDER — OMEPRAZOLE 20 MG/1
20 CAPSULE, DELAYED RELEASE ORAL DAILY
Qty: 90 CAPSULE | Refills: 1 | Status: SHIPPED | OUTPATIENT
Start: 2020-09-01 | End: 2021-03-31

## 2020-09-01 RX ORDER — ESCITALOPRAM OXALATE 10 MG/1
TABLET ORAL
COMMUNITY
Start: 2020-08-11 | End: 2020-09-01 | Stop reason: SDUPTHER

## 2020-09-01 NOTE — PROGRESS NOTES
Chief Complaint   Patient presents with   • Asthma   • Obesity       HPI     Camila Sena is a pleasant 40 y.o. female who is here for routine follow-up of right shoulder pain, anxiety, elevated liver enzymes, abnormal thyroid labs, seasonal allergies, GERD, positive HPV 16 and obesity.  Patient recently underwent right shoulder surgery and is doing well overall.  She has noticed weight gain since having surgery.  She is working on weight loss and is having difficulty losing any weight.  She reports elevated blood pressure at home.  Denies any dizziness or headaches.  Not currently on any medications.  She reports that her asthma and seasonal allergies are doing great with her current medication regiment.  Omeprazole 20 mg is helping with her reflux.  She has an improvement in her anxiety with the Lexapro 10 mg daily but reports that she still has significant stress on a daily basis.    Past Medical History:   Diagnosis Date   • Asthma    • GERD (gastroesophageal reflux disease)    • History of BCG vaccination    • History of cervical dysplasia     Resolved w/o any treatment       Past Surgical History:   Procedure Laterality Date   •  SECTION     • LAPAROTOMY EXPLORATORY FOR ECTOPIC PREGNANCY      Side unknown   • LAPAROTOMY EXPLORATORY FOR ECTOPIC PREGNANCY      Side unknown   • UPPER GASTROINTESTINAL ENDOSCOPY  2018       Family History   Problem Relation Age of Onset   • Hypertension Mother    • Migraines Father    • Breast cancer Neg Hx    • Ovarian cancer Neg Hx    • Uterine cancer Neg Hx    • Endometrial cancer Neg Hx    • Colon cancer Neg Hx        Social History     Socioeconomic History   • Marital status: Significant Other     Spouse name: José Antonio   • Number of children: 1   • Years of education: H.S.   • Highest education level: High school graduate   Occupational History   • Occupation:      Employer: [employer]   Tobacco Use   •  "Smoking status: Never Smoker   • Smokeless tobacco: Never Used   Substance and Sexual Activity   • Alcohol use: Not Currently   • Drug use: Never   • Sexual activity: Yes     Partners: Male     Birth control/protection: None       No Known Allergies    ROS  Review of Systems   Constitutional: Negative for chills and fever.   HENT: Negative for postnasal drip and rhinorrhea.    Eyes: Negative for blurred vision.   Respiratory: Negative for cough, shortness of breath and wheezing.    Cardiovascular: Negative for chest pain and palpitations.   Gastrointestinal: Negative for GERD.   Musculoskeletal: Negative for arthralgias and neck pain.   Neurological: Negative for dizziness and headache.   Psychiatric/Behavioral: Positive for agitation and stress. Negative for depressed mood. The patient is nervous/anxious.        Vitals:    09/01/20 1145   BP: 128/92   BP Location: Left arm   Patient Position: Sitting   Cuff Size: Adult   Pulse: 86   SpO2: 98%   Weight: 90 kg (198 lb 6.4 oz)   Height: 167.6 cm (66\")     Body mass index is 32.02 kg/m².    Current Outpatient Medications on File Prior to Visit   Medication Sig Dispense Refill   • albuterol sulfate  (90 Base) MCG/ACT inhaler Inhale 2 puffs Every 4 (Four) Hours As Needed for Wheezing or Shortness of Air. 1 inhaler 5   • [DISCONTINUED] fexofenadine (ALLEGRA) 180 MG tablet Take 180 mg by mouth Daily.     • [DISCONTINUED] montelukast (SINGULAIR) 10 MG tablet Take 10 mg by mouth Every Night.     • [DISCONTINUED] omeprazole (priLOSEC) 20 MG capsule Take 20 mg by mouth Daily.     • [DISCONTINUED] escitalopram (LEXAPRO) 10 MG tablet        No current facility-administered medications on file prior to visit.        Results for orders placed or performed in visit on 02/07/20   CBC Auto Differential   Result Value Ref Range    WBC 5.86 3.40 - 10.80 10*3/mm3    RBC 4.75 3.77 - 5.28 10*6/mm3    Hemoglobin 13.9 12.0 - 15.9 g/dL    Hematocrit 41.3 34.0 - 46.6 %    MCV 86.9 79.0 " - 97.0 fL    MCH 29.3 26.6 - 33.0 pg    MCHC 33.7 31.5 - 35.7 g/dL    RDW 13.0 12.3 - 15.4 %    RDW-SD 40.6 37.0 - 54.0 fl    MPV 10.0 6.0 - 12.0 fL    Platelets 242 140 - 450 10*3/mm3    Neutrophil % 52.3 42.7 - 76.0 %    Lymphocyte % 34.8 19.6 - 45.3 %    Monocyte % 7.2 5.0 - 12.0 %    Eosinophil % 4.8 0.3 - 6.2 %    Basophil % 0.7 0.0 - 1.5 %    Immature Grans % 0.2 0.0 - 0.5 %    Neutrophils, Absolute 3.07 1.70 - 7.00 10*3/mm3    Lymphocytes, Absolute 2.04 0.70 - 3.10 10*3/mm3    Monocytes, Absolute 0.42 0.10 - 0.90 10*3/mm3    Eosinophils, Absolute 0.28 0.00 - 0.40 10*3/mm3    Basophils, Absolute 0.04 0.00 - 0.20 10*3/mm3    Immature Grans, Absolute 0.01 0.00 - 0.05 10*3/mm3    nRBC 0.0 0.0 - 0.2 /100 WBC   Comprehensive Metabolic Panel   Result Value Ref Range    Glucose 97 65 - 99 mg/dL    BUN 14 6 - 20 mg/dL    Creatinine 0.64 0.57 - 1.00 mg/dL    Sodium 139 136 - 145 mmol/L    Potassium 4.4 3.5 - 5.2 mmol/L    Chloride 102 98 - 107 mmol/L    CO2 24.4 22.0 - 29.0 mmol/L    Calcium 9.3 8.6 - 10.5 mg/dL    Total Protein 7.5 6.0 - 8.5 g/dL    Albumin 4.40 3.50 - 5.20 g/dL    ALT (SGPT) 99 (H) 1 - 33 U/L    AST (SGOT) 35 (H) 1 - 32 U/L    Alkaline Phosphatase 74 39 - 117 U/L    Total Bilirubin 0.2 0.2 - 1.2 mg/dL    eGFR Non African Amer 102 >60 mL/min/1.73    eGFR  African Amer 123 >60 mL/min/1.73    Globulin 3.1 gm/dL    A/G Ratio 1.4 g/dL    BUN/Creatinine Ratio 21.9 7.0 - 25.0    Anion Gap 12.6 5.0 - 15.0 mmol/L   TSH Rfx On Abnormal To Free T4   Result Value Ref Range    TSH 3.880 0.270 - 4.200 uIU/mL   Lipid Panel   Result Value Ref Range    Total Cholesterol 222 (H) 0 - 200 mg/dL    Triglycerides 52 0 - 150 mg/dL    HDL Cholesterol 72 (H) 40 - 60 mg/dL    LDL Cholesterol  140 (H) 0 - 100 mg/dL    VLDL Cholesterol 10.4 5 - 40 mg/dL    LDL/HDL Ratio 1.94    Vitamin D 25 Hydroxy   Result Value Ref Range    25 Hydroxy, Vitamin D 27.4 (L) 30.0 - 100.0 ng/ml   QuantiFERON TB Plus Client Incubated   Result Value  Ref Range    QuantiFERON Criteria Comment     QUANTIFERON TB1 AG VALUE 0.04 IU/mL    QUANTIFERON TB2 AG VALUE 0.03 IU/mL    QuantiFERON Nil Value 0.02 IU/mL    QuantiFERON Mitogen Value >10.00 IU/mL    QUANTIFERON-TB GOLD PLUS Negative Negative       PE    Physical Exam   Constitutional: Vital signs are normal. She appears well-developed and well-nourished. She is active and cooperative. She does not appear ill. No distress. She is obese.  HENT:   Head: Normocephalic and atraumatic.   Eyes: EOM are normal.   Neck: Normal range of motion.   Cardiovascular: Normal rate, regular rhythm and normal heart sounds.   Pulmonary/Chest: No respiratory distress.   Musculoskeletal: Normal range of motion. She exhibits no edema.   Neurological: She is alert.   Skin: Skin is warm. She is not diaphoretic. No erythema.   Psychiatric: She has a normal mood and affect. Her speech is normal and behavior is normal. Judgment and thought content normal. She is not actively hallucinating. She is attentive.   Vitals reviewed.      A/P    Camila was seen today for asthma and obesity.    Diagnoses and all orders for this visit:    Anxiety  -     escitalopram (LEXAPRO) 20 MG tablet; Take 1 tablet by mouth Every Morning.  Ongoing anxiety.  Doing well on lexapro.  Would like to increase and this is reasonable.    Elevated liver enzymes  -     Comprehensive Metabolic Panel    Abnormal thyroid blood test  -     TSH Rfx On Abnormal To Free T4  Not on medications.  Elevated at previous appointment, repeat to see if this is part of weight gain.    Seasonal allergies  -     montelukast (SINGULAIR) 10 MG tablet; Take 1 tablet by mouth Every Night.  -     fexofenadine (ALLEGRA) 180 MG tablet; Take 1 tablet by mouth Daily.  Stable with current medications.    Gastroesophageal reflux disease with esophagitis  -     omeprazole (priLOSEC) 20 MG capsule; Take 1 capsule by mouth Daily.  No issues with current medications.    Positive HPV 16  Established with  gynecology.    Encounter for hepatitis C screening test for low risk patient  -     Hepatitis C Antibody    Need for influenza vaccination  -     Fluarix Quad >6 Months ([id number])    Need for HPV vaccination  -     HPV Vaccine QuadriValent 3 Dose IM    Essential hypertension  -     amLODIPine (NORVASC) 5 MG tablet; Take 1 tablet by mouth Daily.  Elevated diastolic readings.  Trial of amlodipine 5 mg nightly.    Chronic right shoulder pain  Improved with recent surgery, doing well overall.       Plan of care reviewed with patient at the conclusion of today's visit. Education was provided regarding diagnosis, management and any prescribed or recommended OTC medications.  Patient verbalizes understanding of and agreement with management plan.    Return in about 6 months (around 3/1/2021) for Annual physical.     Zaida De Jesus PA-C  Answers for HPI/ROS submitted by the patient on 8/25/2020   Hypertension  What is the primary reason for your visit?: High Blood Pressure

## 2020-11-03 DIAGNOSIS — F41.9 ANXIETY: ICD-10-CM

## 2020-11-03 DIAGNOSIS — I10 ESSENTIAL HYPERTENSION: ICD-10-CM

## 2020-11-03 RX ORDER — AMLODIPINE BESYLATE 5 MG/1
5 TABLET ORAL DAILY
Qty: 90 TABLET | Refills: 1 | Status: SHIPPED | OUTPATIENT
Start: 2020-11-03 | End: 2021-04-02

## 2020-11-03 RX ORDER — ESCITALOPRAM OXALATE 20 MG/1
20 TABLET ORAL EVERY MORNING
Qty: 90 TABLET | Refills: 1 | Status: SHIPPED | OUTPATIENT
Start: 2020-11-03 | End: 2021-04-02

## 2021-03-04 ENCOUNTER — LAB (OUTPATIENT)
Dept: LAB | Facility: HOSPITAL | Age: 41
End: 2021-03-04

## 2021-03-04 ENCOUNTER — OFFICE VISIT (OUTPATIENT)
Dept: FAMILY MEDICINE CLINIC | Facility: CLINIC | Age: 41
End: 2021-03-04

## 2021-03-04 VITALS
HEART RATE: 86 BPM | TEMPERATURE: 97.8 F | SYSTOLIC BLOOD PRESSURE: 126 MMHG | OXYGEN SATURATION: 98 % | HEIGHT: 66 IN | BODY MASS INDEX: 31.98 KG/M2 | WEIGHT: 199 LBS | DIASTOLIC BLOOD PRESSURE: 82 MMHG

## 2021-03-04 DIAGNOSIS — Z13.220 SCREENING FOR CHOLESTEROL LEVEL: ICD-10-CM

## 2021-03-04 DIAGNOSIS — Z12.31 ENCOUNTER FOR SCREENING MAMMOGRAM FOR MALIGNANT NEOPLASM OF BREAST: ICD-10-CM

## 2021-03-04 DIAGNOSIS — Z13.0 SCREENING FOR DEFICIENCY ANEMIA: ICD-10-CM

## 2021-03-04 DIAGNOSIS — Z13.1 SCREENING FOR DIABETES MELLITUS: ICD-10-CM

## 2021-03-04 DIAGNOSIS — R10.2 SUPRAPUBIC PRESSURE: ICD-10-CM

## 2021-03-04 DIAGNOSIS — R87.810 CERVICAL HIGH RISK HUMAN PAPILLOMAVIRUS (HPV) DNA TEST POSITIVE: ICD-10-CM

## 2021-03-04 DIAGNOSIS — Z13.29 SCREENING FOR THYROID DISORDER: ICD-10-CM

## 2021-03-04 DIAGNOSIS — Z00.00 PHYSICAL EXAM, ANNUAL: Primary | ICD-10-CM

## 2021-03-04 DIAGNOSIS — G47.33 OSA (OBSTRUCTIVE SLEEP APNEA): ICD-10-CM

## 2021-03-04 LAB
ALBUMIN SERPL-MCNC: 4.4 G/DL (ref 3.5–5.2)
ALBUMIN/GLOB SERPL: 1.4 G/DL
ALP SERPL-CCNC: 72 U/L (ref 39–117)
ALT SERPL W P-5'-P-CCNC: 19 U/L (ref 1–33)
AMORPH URATE CRY URNS QL MICRO: ABNORMAL /HPF
ANION GAP SERPL CALCULATED.3IONS-SCNC: 9.9 MMOL/L (ref 5–15)
AST SERPL-CCNC: 16 U/L (ref 1–32)
BACTERIA UR QL AUTO: ABNORMAL /HPF
BASOPHILS # BLD AUTO: 0.04 10*3/MM3 (ref 0–0.2)
BASOPHILS NFR BLD AUTO: 0.7 % (ref 0–1.5)
BILIRUB SERPL-MCNC: 0.3 MG/DL (ref 0–1.2)
BILIRUB UR QL STRIP: NEGATIVE
BUN SERPL-MCNC: 12 MG/DL (ref 6–20)
BUN/CREAT SERPL: 17.9 (ref 7–25)
CALCIUM SPEC-SCNC: 9.2 MG/DL (ref 8.6–10.5)
CHLORIDE SERPL-SCNC: 102 MMOL/L (ref 98–107)
CHOLEST SERPL-MCNC: 225 MG/DL (ref 0–200)
CLARITY UR: ABNORMAL
CO2 SERPL-SCNC: 27.1 MMOL/L (ref 22–29)
COLOR UR: YELLOW
CREAT SERPL-MCNC: 0.67 MG/DL (ref 0.57–1)
DEPRECATED RDW RBC AUTO: 41.5 FL (ref 37–54)
EOSINOPHIL # BLD AUTO: 0.19 10*3/MM3 (ref 0–0.4)
EOSINOPHIL NFR BLD AUTO: 3.1 % (ref 0.3–6.2)
ERYTHROCYTE [DISTWIDTH] IN BLOOD BY AUTOMATED COUNT: 12.9 % (ref 12.3–15.4)
GFR SERPL CREATININE-BSD FRML MDRD: 116 ML/MIN/1.73
GFR SERPL CREATININE-BSD FRML MDRD: 96 ML/MIN/1.73
GLOBULIN UR ELPH-MCNC: 3.2 GM/DL
GLUCOSE SERPL-MCNC: 95 MG/DL (ref 65–99)
GLUCOSE UR STRIP-MCNC: NEGATIVE MG/DL
HCT VFR BLD AUTO: 41.5 % (ref 34–46.6)
HDLC SERPL-MCNC: 78 MG/DL (ref 40–60)
HGB BLD-MCNC: 13.7 G/DL (ref 12–15.9)
HGB UR QL STRIP.AUTO: ABNORMAL
HYALINE CASTS UR QL AUTO: ABNORMAL /LPF
IMM GRANULOCYTES # BLD AUTO: 0.01 10*3/MM3 (ref 0–0.05)
IMM GRANULOCYTES NFR BLD AUTO: 0.2 % (ref 0–0.5)
KETONES UR QL STRIP: NEGATIVE
LDLC SERPL CALC-MCNC: 134 MG/DL (ref 0–100)
LDLC/HDLC SERPL: 1.69 {RATIO}
LEUKOCYTE ESTERASE UR QL STRIP.AUTO: ABNORMAL
LYMPHOCYTES # BLD AUTO: 2.1 10*3/MM3 (ref 0.7–3.1)
LYMPHOCYTES NFR BLD AUTO: 34.5 % (ref 19.6–45.3)
MCH RBC QN AUTO: 29.3 PG (ref 26.6–33)
MCHC RBC AUTO-ENTMCNC: 33 G/DL (ref 31.5–35.7)
MCV RBC AUTO: 88.9 FL (ref 79–97)
MONOCYTES # BLD AUTO: 0.45 10*3/MM3 (ref 0.1–0.9)
MONOCYTES NFR BLD AUTO: 7.4 % (ref 5–12)
NEUTROPHILS NFR BLD AUTO: 3.3 10*3/MM3 (ref 1.7–7)
NEUTROPHILS NFR BLD AUTO: 54.1 % (ref 42.7–76)
NITRITE UR QL STRIP: NEGATIVE
NRBC BLD AUTO-RTO: 0 /100 WBC (ref 0–0.2)
PH UR STRIP.AUTO: <=5 [PH] (ref 5–8)
PLATELET # BLD AUTO: 259 10*3/MM3 (ref 140–450)
PMV BLD AUTO: 9.8 FL (ref 6–12)
POTASSIUM SERPL-SCNC: 4.3 MMOL/L (ref 3.5–5.2)
PROT SERPL-MCNC: 7.6 G/DL (ref 6–8.5)
PROT UR QL STRIP: NEGATIVE
RBC # BLD AUTO: 4.67 10*6/MM3 (ref 3.77–5.28)
RBC # UR: ABNORMAL /HPF
REF LAB TEST METHOD: ABNORMAL
SODIUM SERPL-SCNC: 139 MMOL/L (ref 136–145)
SP GR UR STRIP: 1.02 (ref 1–1.03)
SQUAMOUS #/AREA URNS HPF: ABNORMAL /HPF
TRIGL SERPL-MCNC: 74 MG/DL (ref 0–150)
TSH SERPL DL<=0.05 MIU/L-ACNC: 4.02 UIU/ML (ref 0.27–4.2)
UROBILINOGEN UR QL STRIP: ABNORMAL
VLDLC SERPL-MCNC: 13 MG/DL (ref 5–40)
WBC # BLD AUTO: 6.09 10*3/MM3 (ref 3.4–10.8)
WBC UR QL AUTO: ABNORMAL /HPF

## 2021-03-04 PROCEDURE — 80061 LIPID PANEL: CPT

## 2021-03-04 PROCEDURE — 85025 COMPLETE CBC W/AUTO DIFF WBC: CPT

## 2021-03-04 PROCEDURE — 99396 PREV VISIT EST AGE 40-64: CPT | Performed by: PHYSICIAN ASSISTANT

## 2021-03-04 PROCEDURE — 84443 ASSAY THYROID STIM HORMONE: CPT

## 2021-03-04 PROCEDURE — 81001 URINALYSIS AUTO W/SCOPE: CPT

## 2021-03-04 PROCEDURE — 80053 COMPREHEN METABOLIC PANEL: CPT

## 2021-03-04 NOTE — PROGRESS NOTES
Patient Care Team:  Zaida De Jesus PA-C as PCP - General (Physician Assistant)  Duy Rodríguez MD as Obstetrician (Obstetrics and Gynecology)     Chief complaint: Patient is in today for a physical     Camilakaz Sena is a 41 y.o. female who presents for her yearly physical exam.     Patient presents for annual physical exam.  She presents for management of supra pubic pressure, positive HPV 16 and sleep issues.  She reports that in the last day or 2 she has noticed pressure along her lower abdomen.  She denies any urinary symptoms currently.  She was recently diagnosed with HPV 16 and is followed by gynecology.  She is due for repeat annual Pap smear in June/July 2021.  She will contact Dr. Hawley and schedule this.  She is due for mammogram June/July as well and an order will be placed.  Patient reports daytime somnolence, napping, morning headaches, snoring and waking up gasping for air.  She has never been checked for sleep apnea.  She states that she has difficulty resting well at night and always feels fatigued.  She denies any skin lesions or moles.  She is due to go to the dentist and will make an appointment.  She is up-to-date on eye exam.  She recently had a COVID-19 vaccine.         Review of Systems   Constitutional: Positive for fatigue. Negative for chills, diaphoresis and fever.   HENT: Negative for congestion, ear pain, hearing loss, postnasal drip, rhinorrhea and sore throat.    Eyes: Negative for blurred vision and pain.   Respiratory: Negative for cough, shortness of breath and wheezing.    Cardiovascular: Negative for chest pain and leg swelling.   Gastrointestinal: Positive for abdominal distention. Negative for abdominal pain, blood in stool, constipation, diarrhea, nausea, vomiting and indigestion.   Endocrine: Negative for polyuria.   Genitourinary: Negative for dysuria, flank pain and hematuria.   Musculoskeletal: Negative for arthralgias, gait problem and myalgias.    Skin: Negative for rash and skin lesions.   Neurological: Negative for dizziness and headache.   Psychiatric/Behavioral: Positive for sleep disturbance and stress. Negative for self-injury, suicidal ideas and depressed mood. The patient is not nervous/anxious.         History  Past Medical History:   Diagnosis Date   • Asthma    • GERD (gastroesophageal reflux disease)    • History of BCG vaccination    • History of cervical dysplasia     Resolved w/o any treatment      Past Surgical History:   Procedure Laterality Date   •  SECTION     • LAPAROTOMY EXPLORATORY FOR ECTOPIC PREGNANCY      Side unknown   • LAPAROTOMY EXPLORATORY FOR ECTOPIC PREGNANCY      Side unknown   • UPPER GASTROINTESTINAL ENDOSCOPY  2018      No Known Allergies   Family History   Problem Relation Age of Onset   • Hypertension Mother    • Migraines Father    • Breast cancer Neg Hx    • Ovarian cancer Neg Hx    • Uterine cancer Neg Hx    • Endometrial cancer Neg Hx    • Colon cancer Neg Hx       Social History     Socioeconomic History   • Marital status: Significant Other     Spouse name: José Antonio   • Number of children: 1   • Years of education: H.S.   • Highest education level: High school graduate   Occupational History   • Occupation:      Employer: [employer]   Tobacco Use   • Smoking status: Never Smoker   • Smokeless tobacco: Never Used   Substance and Sexual Activity   • Alcohol use: Not Currently   • Drug use: Never   • Sexual activity: Yes     Partners: Male     Birth control/protection: None      Current Outpatient Medications on File Prior to Visit   Medication Sig Dispense Refill   • albuterol sulfate  (90 Base) MCG/ACT inhaler Inhale 2 puffs Every 4 (Four) Hours As Needed for Wheezing or Shortness of Air. 1 inhaler 5   • amLODIPine (NORVASC) 5 MG tablet Take 1 tablet by mouth Daily. 90 tablet 1   • escitalopram (LEXAPRO) 20 MG tablet Take 1 tablet by mouth Every  Morning. 90 tablet 1   • fexofenadine (ALLEGRA) 180 MG tablet Take 1 tablet by mouth Daily. 90 tablet 1   • montelukast (SINGULAIR) 10 MG tablet Take 1 tablet by mouth Every Night. 90 tablet 1   • omeprazole (priLOSEC) 20 MG capsule Take 1 capsule by mouth Daily. 90 capsule 1     No current facility-administered medications on file prior to visit.        Results for orders placed or performed in visit on 09/01/20   Comprehensive Metabolic Panel    Specimen: Blood   Result Value Ref Range    Glucose 92 65 - 99 mg/dL    BUN 14 6 - 20 mg/dL    Creatinine 0.65 0.57 - 1.00 mg/dL    Sodium 135 (L) 136 - 145 mmol/L    Potassium 4.1 3.5 - 5.2 mmol/L    Chloride 100 98 - 107 mmol/L    CO2 25.9 22.0 - 29.0 mmol/L    Calcium 9.5 8.6 - 10.5 mg/dL    Total Protein 7.7 6.0 - 8.5 g/dL    Albumin 4.40 3.50 - 5.20 g/dL    ALT (SGPT) 31 1 - 33 U/L    AST (SGOT) 26 1 - 32 U/L    Alkaline Phosphatase 74 39 - 117 U/L    Total Bilirubin 0.3 0.0 - 1.2 mg/dL    eGFR Non African Amer 100 >60 mL/min/1.73    eGFR  African Amer 121 >60 mL/min/1.73    Globulin 3.3 gm/dL    A/G Ratio 1.3 g/dL    BUN/Creatinine Ratio 21.5 7.0 - 25.0    Anion Gap 9.1 5.0 - 15.0 mmol/L   TSH Rfx On Abnormal To Free T4    Specimen: Blood   Result Value Ref Range    TSH 3.130 0.270 - 4.200 uIU/mL   Hepatitis C Antibody    Specimen: Blood   Result Value Ref Range    Hepatitis C Ab Non-Reactive Non-Reactive       Health Maintenance   Topic Date Due   • Pneumococcal Vaccine 0-64 (1 of 1 - PPSV23) 01/14/1984   • COVID-19 Vaccine (2 of 2 - Pfizer series) 03/08/2021   • TDAP/TD VACCINES (3 - Td) 03/07/2021   • MAMMOGRAM  06/03/2021   • ANNUAL PHYSICAL  03/05/2022   • PAP SMEAR  06/04/2023   • HEPATITIS C SCREENING  Completed   • INFLUENZA VACCINE  Completed   • MENINGOCOCCAL VACCINE  Aged Out       Immunizations  Td/Tdap(Booster Q 10 yrs):  Completed  Flu (Yearly):  Completed  Pneumovax (1 yr after Prevnar):  N/A  [id number] (1 yr after Pneumo):  N/A  Hep B:   N/A  Shringrix:  N/A}   Immunization History   Administered Date(s) Administered   • COVID-19 (PFIZER) 02/15/2021   • Flu Mist 2018   • Flu Vaccine Quad PF >36MO 2018   • Flulaval/Fluarix/Fluzone Quad 2020, 2020   • HPV Quadrivalent 2020, 2020   • Hepatitis A 2011   • Hepatitis B Vaccine Adult IM 2011   • Influenza Quad Vaccine (Inpatient) 2011   • Influenza, Unspecified 10/17/2019, 2020   • Tdap 2010, 2011         Diabetes:  No   Eye Exam: N/A   Foot Exam:  N/A  Obesity Counseling:  N/A  Hemoglobin A1C   Date Value Ref Range Status   2019 5.10 4.80 - 5.60 % Final       Patient's Body mass index is 32.12 kg/m². BMI is above normal parameters. Recommendations include: exercise counseling and nutrition counseling.      Colorectal Screening:  N/A  Pap:  Complete  Mammogram:  Ordered Today  PSA(Over age 50):  N/A  US Aorta (For male smokers, age 65):  N/A  CT for Smoker (Age 55-75, 30pk yr):  N/A  Bone Density/DEXA:  N/A  Hep C ( 2435-4740):  Complete      Results for orders placed or performed in visit on 20   Comprehensive Metabolic Panel    Specimen: Blood   Result Value Ref Range    Glucose 92 65 - 99 mg/dL    BUN 14 6 - 20 mg/dL    Creatinine 0.65 0.57 - 1.00 mg/dL    Sodium 135 (L) 136 - 145 mmol/L    Potassium 4.1 3.5 - 5.2 mmol/L    Chloride 100 98 - 107 mmol/L    CO2 25.9 22.0 - 29.0 mmol/L    Calcium 9.5 8.6 - 10.5 mg/dL    Total Protein 7.7 6.0 - 8.5 g/dL    Albumin 4.40 3.50 - 5.20 g/dL    ALT (SGPT) 31 1 - 33 U/L    AST (SGOT) 26 1 - 32 U/L    Alkaline Phosphatase 74 39 - 117 U/L    Total Bilirubin 0.3 0.0 - 1.2 mg/dL    eGFR Non African Amer 100 >60 mL/min/1.73    eGFR  African Amer 121 >60 mL/min/1.73    Globulin 3.3 gm/dL    A/G Ratio 1.3 g/dL    BUN/Creatinine Ratio 21.5 7.0 - 25.0    Anion Gap 9.1 5.0 - 15.0 mmol/L   TSH Rfx On Abnormal To Free T4    Specimen: Blood   Result Value Ref Range    TSH 3.130 0.270 - 4.200  "uIU/mL   Hepatitis C Antibody    Specimen: Blood   Result Value Ref Range    Hepatitis C Ab Non-Reactive Non-Reactive            Vitals:    03/04/21 0834   BP: 126/82   Pulse: 86   Temp: 97.8 °F (36.6 °C)   SpO2: 98%   Weight: 90.3 kg (199 lb)   Height: 167.6 cm (66\")   PainSc: 0-No pain       Body mass index is 32.12 kg/m².    Physical Exam  Vitals signs reviewed.   Constitutional:       General: She is not in acute distress.     Appearance: Normal appearance. She is well-developed. She is obese. She is not ill-appearing or diaphoretic.   HENT:      Head: Normocephalic and atraumatic.      Right Ear: Hearing, tympanic membrane, ear canal and external ear normal.      Left Ear: Hearing, tympanic membrane, ear canal and external ear normal.      Nose: Nose normal.      Right Sinus: No maxillary sinus tenderness or frontal sinus tenderness.      Left Sinus: No maxillary sinus tenderness or frontal sinus tenderness.      Mouth/Throat:      Pharynx: Uvula midline.      Comments: mallampati II  Eyes:      General: Lids are normal.      Extraocular Movements: Extraocular movements intact.      Conjunctiva/sclera: Conjunctivae normal.   Neck:      Musculoskeletal: Normal range of motion.      Thyroid: No thyroid mass or thyromegaly.      Trachea: Trachea and phonation normal.   Cardiovascular:      Rate and Rhythm: Normal rate and regular rhythm.      Heart sounds: Normal heart sounds.   Pulmonary:      Effort: Pulmonary effort is normal.      Breath sounds: Normal breath sounds.   Abdominal:      General: Bowel sounds are normal. There is no distension.      Palpations: Abdomen is soft. Abdomen is not rigid.      Tenderness: There is abdominal tenderness in the suprapubic area. There is no guarding.   Musculoskeletal: Normal range of motion.      Right lower leg: No edema.      Left lower leg: No edema.   Lymphadenopathy:      Cervical: No cervical adenopathy.      Right cervical: No superficial cervical adenopathy.     " Left cervical: No superficial cervical adenopathy.   Skin:     General: Skin is warm.      Findings: No erythema or rash.      Nails: There is no clubbing.     Neurological:      Mental Status: She is alert and oriented to person, place, and time.      Coordination: Coordination normal.      Gait: Gait normal.      Deep Tendon Reflexes: Reflexes are normal and symmetric.      Comments: CN grossly intact   Psychiatric:         Attention and Perception: Attention and perception normal. She is attentive.         Mood and Affect: Mood and affect normal.         Speech: Speech normal.         Behavior: Behavior normal. Behavior is cooperative.         Thought Content: Thought content normal.         Cognition and Memory: Cognition and memory normal.         Judgment: Judgment normal.             Counseling provided on diet and nutrition, exercise, weight management, supplements and mental health concerns.    Diagnoses and all orders for this visit:    1. Physical exam, annual (Primary)  PE is unremarkable  Preventative labs ordered  Mammogram ordered  Gynecologist - patient to call and schedule annual  Dentist - goes regularly  Ophthalmologist - goes regularly    2. Suprapubic pressure  -     Urinalysis With Culture If Indicated - Urine, Clean Catch; Future  Mild TTP.  No urinary symptoms.    3. Positive HPV 16  Followed by gynecology.  Needs HPV vaccination.  Pending second Covid vaccine next week.    4. Screening for thyroid disorder  -     TSH Rfx On Abnormal To Free T4; Future    5. Screening for deficiency anemia  -     CBC Auto Differential; Future    6. Screening for cholesterol level  -     Lipid Panel; Future    7. Screening for diabetes mellitus  -     Comprehensive Metabolic Panel; Future    8. Encounter for screening mammogram for malignant neoplasm of breast  -     Mammo Screening Digital Tomosynthesis Bilateral With CAD; Future    9. JOSEMANUEL  Patient reports insomnia, restless sleep, daytime somnolence, morning  headaches and waking up gasping for air.  mallampati II.  Obese.  Will order sleep study to evaluate further.     Zaida De Jesus PA-C   3/4/2021   09:30 EST

## 2021-03-04 NOTE — PATIENT INSTRUCTIONS
"Call Dr. Hawley and schedule annual female exam in June/July.  Start taking vitamin D3 2,000 units daily.    General Health Maintenance:  -Yearly dermatology exam  -Yearly eye exam if you are diabetic, otherwise every 2 years for patients without diabetes  -Dental exams/cleanings at least twice a year  -Staying current on your required colonoscopy, starts at age 45 unless there are other indications prior  -Regular pap smears (at least every 1-3 years until age 65)  -Yearly pelvic and breast exams  -Yearly mammograms starting at age 40    Vaccines:  -Talk to pharmacist about shingrix shot  -Obtain Covid-19 vaccination when available    The largest impact you can have on your own health is getting the proper nutrition, exercise and maintaining an overall healthy body weight.  Proper nutrition involves eating lots of vegetables, fruit, minimal lean meat and avoiding processed foods and limiting refined sugars, carbohydrates and starches (\"the white stuff\").  Drinking at least 8 cups of water daily.  Walking at least 30 minutes a day and if possible, increasing this to a more cardiovascular aerobic exercise.  Maintaining a healthy body weight.      If you smoke or use tobacco products, quitting is extremely important and I am happy to discuss options with you regarding how to do this and what's available to assist you.    If you consume alcohol, limit your alcohol intake to 2 drinks a day for men and 1 drink a day for woman.    It is also important to make sure to keep regular appointments and have all general health maintenance items completed in a timely manner.      Thank you for entrusting me with your care.  It is a pleasure and honor to participate in your health.    "

## 2021-03-31 DIAGNOSIS — K21.00 GASTROESOPHAGEAL REFLUX DISEASE WITH ESOPHAGITIS: ICD-10-CM

## 2021-03-31 DIAGNOSIS — J30.2 SEASONAL ALLERGIES: ICD-10-CM

## 2021-03-31 RX ORDER — MONTELUKAST SODIUM 10 MG/1
TABLET ORAL
Qty: 90 TABLET | Refills: 0 | Status: SHIPPED | OUTPATIENT
Start: 2021-03-31 | End: 2021-07-19 | Stop reason: SDUPTHER

## 2021-03-31 RX ORDER — OMEPRAZOLE 20 MG/1
CAPSULE, DELAYED RELEASE ORAL
Qty: 90 CAPSULE | Refills: 0 | Status: SHIPPED | OUTPATIENT
Start: 2021-03-31 | End: 2021-07-19 | Stop reason: SDUPTHER

## 2021-04-02 DIAGNOSIS — I10 ESSENTIAL HYPERTENSION: ICD-10-CM

## 2021-04-02 DIAGNOSIS — F41.9 ANXIETY: ICD-10-CM

## 2021-04-02 RX ORDER — AMLODIPINE BESYLATE 5 MG/1
TABLET ORAL
Qty: 60 TABLET | Refills: 0 | Status: SHIPPED | OUTPATIENT
Start: 2021-04-02 | End: 2021-06-01

## 2021-04-02 RX ORDER — ESCITALOPRAM OXALATE 20 MG/1
TABLET ORAL
Qty: 60 TABLET | Refills: 0 | Status: SHIPPED | OUTPATIENT
Start: 2021-04-02 | End: 2021-06-01

## 2021-05-02 DIAGNOSIS — J30.2 SEASONAL ALLERGIES: ICD-10-CM

## 2021-05-02 RX ORDER — FEXOFENADINE HCL 180 MG/1
180 TABLET ORAL DAILY
Qty: 90 TABLET | Refills: 1 | Status: CANCELLED | OUTPATIENT
Start: 2021-05-02

## 2021-05-03 RX ORDER — FEXOFENADINE HCL 180 MG/1
TABLET ORAL
Qty: 90 TABLET | Refills: 0 | Status: SHIPPED | OUTPATIENT
Start: 2021-05-03 | End: 2021-07-19 | Stop reason: SDUPTHER

## 2021-05-10 ENCOUNTER — HOSPITAL ENCOUNTER (OUTPATIENT)
Dept: MAMMOGRAPHY | Facility: HOSPITAL | Age: 41
Discharge: HOME | End: 2021-05-10
Admitting: PHYSICIAN ASSISTANT

## 2021-05-10 ENCOUNTER — APPOINTMENT (OUTPATIENT)
Dept: OTHER | Facility: HOSPITAL | Age: 41
End: 2021-05-10

## 2021-05-10 DIAGNOSIS — Z12.31 ENCOUNTER FOR SCREENING MAMMOGRAM FOR MALIGNANT NEOPLASM OF BREAST: ICD-10-CM

## 2021-05-10 DIAGNOSIS — Z92.89 HISTORY OF MAMMOGRAM: ICD-10-CM

## 2021-05-10 PROCEDURE — 77063 BREAST TOMOSYNTHESIS BI: CPT

## 2021-05-10 PROCEDURE — 77067 SCR MAMMO BI INCL CAD: CPT | Performed by: RADIOLOGY

## 2021-05-10 PROCEDURE — 77067 SCR MAMMO BI INCL CAD: CPT

## 2021-05-10 PROCEDURE — 77063 BREAST TOMOSYNTHESIS BI: CPT | Performed by: RADIOLOGY

## 2021-05-30 DIAGNOSIS — F41.9 ANXIETY: ICD-10-CM

## 2021-05-30 DIAGNOSIS — I10 ESSENTIAL HYPERTENSION: ICD-10-CM

## 2021-06-01 RX ORDER — ESCITALOPRAM OXALATE 20 MG/1
TABLET ORAL
Qty: 90 TABLET | Refills: 1 | Status: SHIPPED | OUTPATIENT
Start: 2021-06-01 | End: 2021-12-09 | Stop reason: SDUPTHER

## 2021-06-01 RX ORDER — AMLODIPINE BESYLATE 5 MG/1
TABLET ORAL
Qty: 90 TABLET | Refills: 1 | Status: SHIPPED | OUTPATIENT
Start: 2021-06-01 | End: 2021-10-18

## 2021-06-21 ENCOUNTER — TELEPHONE (OUTPATIENT)
Dept: OBSTETRICS AND GYNECOLOGY | Facility: CLINIC | Age: 41
End: 2021-06-21

## 2021-06-21 RX ORDER — FLUCONAZOLE 150 MG/1
150 TABLET ORAL DAILY
Qty: 2 TABLET | Refills: 0 | Status: SHIPPED | OUTPATIENT
Start: 2021-06-21 | End: 2021-08-24

## 2021-06-21 NOTE — TELEPHONE ENCOUNTER
Prescription for treatment sent to pharmacy. If no improvement, will need to be seen in the office for an exam.

## 2021-06-21 NOTE — TELEPHONE ENCOUNTER
DR CMKINNEY    HAVING VAGINAL ITCHING, DISCHARGE, NO ODOR, FOR ABOUT 3 WEEKS. SHE HAS USED THE OVER THE COUNTER MEDICATION BUT IT KEEPS COMING BACK.      [phone]

## 2021-07-17 DIAGNOSIS — J30.2 SEASONAL ALLERGIES: ICD-10-CM

## 2021-07-17 DIAGNOSIS — K21.00 GASTROESOPHAGEAL REFLUX DISEASE WITH ESOPHAGITIS: ICD-10-CM

## 2021-07-19 ENCOUNTER — TELEPHONE (OUTPATIENT)
Dept: FAMILY MEDICINE CLINIC | Facility: CLINIC | Age: 41
End: 2021-07-19

## 2021-07-19 DIAGNOSIS — J30.2 SEASONAL ALLERGIES: ICD-10-CM

## 2021-07-19 DIAGNOSIS — K21.00 GASTROESOPHAGEAL REFLUX DISEASE WITH ESOPHAGITIS: ICD-10-CM

## 2021-07-19 RX ORDER — MONTELUKAST SODIUM 10 MG/1
10 TABLET ORAL NIGHTLY
Qty: 90 TABLET | Refills: 0 | Status: SHIPPED | OUTPATIENT
Start: 2021-07-19 | End: 2021-10-20 | Stop reason: SDUPTHER

## 2021-07-19 RX ORDER — OMEPRAZOLE 20 MG/1
CAPSULE, DELAYED RELEASE ORAL
Qty: 90 CAPSULE | Refills: 0 | Status: SHIPPED | OUTPATIENT
Start: 2021-07-19 | End: 2021-07-19 | Stop reason: SDUPTHER

## 2021-07-19 RX ORDER — FEXOFENADINE HCL 180 MG/1
TABLET ORAL
Qty: 90 TABLET | Refills: 0 | Status: SHIPPED | OUTPATIENT
Start: 2021-07-19 | End: 2021-07-19 | Stop reason: SDUPTHER

## 2021-07-19 RX ORDER — FEXOFENADINE HCL 180 MG/1
180 TABLET ORAL DAILY
Qty: 90 TABLET | Refills: 0 | Status: SHIPPED | OUTPATIENT
Start: 2021-07-19 | End: 2022-01-15

## 2021-07-19 RX ORDER — OMEPRAZOLE 20 MG/1
20 CAPSULE, DELAYED RELEASE ORAL DAILY
Qty: 90 CAPSULE | Refills: 0 | Status: SHIPPED | OUTPATIENT
Start: 2021-07-19 | End: 2021-10-20 | Stop reason: SDUPTHER

## 2021-07-19 RX ORDER — MONTELUKAST SODIUM 10 MG/1
TABLET ORAL
Qty: 90 TABLET | Refills: 0 | Status: SHIPPED | OUTPATIENT
Start: 2021-07-19 | End: 2021-07-19 | Stop reason: SDUPTHER

## 2021-07-19 NOTE — TELEPHONE ENCOUNTER
Pharmacy Name:  [facility]     Pharmacy representative name: [place]     Pharmacy representative phone number: [phone]    What medication are you calling in regards to: montelukast (SINGULAIR) 10 MG tablet, fexofenadine (ALLEGRA) 180 MG tablet   omeprazole (priLOSEC) 20 MG capsule     What question does the pharmacy have: CAN THESE MEDICATIONS BE RE-FAXED TO THE PHARMACY IN ENGLISH.     Who is the provider that prescribed the medication: ABHILASH BLOCK     Additional notes: PHARMACY STATES THAT THE PRESCRIPTIONS SENT OT THEM ARE ALL IN [place]. SHE STATES THAT SHE IS NEEDING THESE PRESCRIPTION IN ENGLISH AND IF NEEDED FROM THERE THE PHARMACY CAN TRANSLATE THE PRESCRIPTIONS BACK TO [place].

## 2021-07-21 ENCOUNTER — TELEPHONE (OUTPATIENT)
Dept: OBSTETRICS AND GYNECOLOGY | Facility: CLINIC | Age: 41
End: 2021-07-21

## 2021-07-21 NOTE — TELEPHONE ENCOUNTER
FAYE    PT HAS ANNUAL IN AUGUST BUT SHE GOING OUT OF TOWN AND NEEDS TO RESCHEDULE. SHE WANTS SOONER THAN WHATS AVAILABLE. WHERE DO YOU WANT TO PUT HER ON SCHEDULE.

## 2021-07-23 ENCOUNTER — LAB (OUTPATIENT)
Dept: LAB | Facility: HOSPITAL | Age: 41
End: 2021-07-23

## 2021-07-23 ENCOUNTER — OFFICE VISIT (OUTPATIENT)
Dept: OBSTETRICS AND GYNECOLOGY | Facility: CLINIC | Age: 41
End: 2021-07-23

## 2021-07-23 VITALS
SYSTOLIC BLOOD PRESSURE: 142 MMHG | RESPIRATION RATE: 18 BRPM | WEIGHT: 207.4 LBS | DIASTOLIC BLOOD PRESSURE: 88 MMHG | HEIGHT: 66 IN | OXYGEN SATURATION: 100 % | HEART RATE: 97 BPM | BODY MASS INDEX: 33.33 KG/M2

## 2021-07-23 DIAGNOSIS — N92.6 IRREGULAR MENSES: ICD-10-CM

## 2021-07-23 DIAGNOSIS — Z01.419 ENCNTR FOR GYN EXAM (GENERAL) (ROUTINE) W/O ABN FINDINGS: Primary | ICD-10-CM

## 2021-07-23 DIAGNOSIS — Z72.51 UNPROTECTED SEXUAL INTERCOURSE: ICD-10-CM

## 2021-07-23 DIAGNOSIS — N87.0 MILD DYSPLASIA OF CERVIX (CIN I): ICD-10-CM

## 2021-07-23 DIAGNOSIS — R10.30 LOWER ABDOMINAL PAIN: ICD-10-CM

## 2021-07-23 LAB
ESTRADIOL SERPL HS-MCNC: 69 PG/ML
FSH SERPL-ACNC: 6.15 MIU/ML
HBV SURFACE AG SERPL QL IA: NORMAL
HCG INTACT+B SERPL-ACNC: <0.5 MIU/ML
HCV AB SER DONR QL: NORMAL
HIV1+2 AB SER QL: NORMAL
PROLACTIN SERPL-MCNC: 11.6 NG/ML (ref 4.79–23.3)
RPR SER QL: NORMAL
TESTOST SERPL-MCNC: 9.36 NG/DL (ref 8.4–48.1)
TSH SERPL DL<=0.05 MIU/L-ACNC: 3.89 UIU/ML (ref 0.27–4.2)

## 2021-07-23 PROCEDURE — 36415 COLL VENOUS BLD VENIPUNCTURE: CPT | Performed by: OBSTETRICS & GYNECOLOGY

## 2021-07-23 PROCEDURE — 84146 ASSAY OF PROLACTIN: CPT | Performed by: OBSTETRICS & GYNECOLOGY

## 2021-07-23 PROCEDURE — 83498 ASY HYDROXYPROGESTERONE 17-D: CPT | Performed by: OBSTETRICS & GYNECOLOGY

## 2021-07-23 PROCEDURE — 86803 HEPATITIS C AB TEST: CPT | Performed by: OBSTETRICS & GYNECOLOGY

## 2021-07-23 PROCEDURE — 84443 ASSAY THYROID STIM HORMONE: CPT

## 2021-07-23 PROCEDURE — G0432 EIA HIV-1/HIV-2 SCREEN: HCPCS | Performed by: OBSTETRICS & GYNECOLOGY

## 2021-07-23 PROCEDURE — 84403 ASSAY OF TOTAL TESTOSTERONE: CPT | Performed by: OBSTETRICS & GYNECOLOGY

## 2021-07-23 PROCEDURE — 86592 SYPHILIS TEST NON-TREP QUAL: CPT | Performed by: OBSTETRICS & GYNECOLOGY

## 2021-07-23 PROCEDURE — 84702 CHORIONIC GONADOTROPIN TEST: CPT | Performed by: OBSTETRICS & GYNECOLOGY

## 2021-07-23 PROCEDURE — 82670 ASSAY OF TOTAL ESTRADIOL: CPT | Performed by: OBSTETRICS & GYNECOLOGY

## 2021-07-23 PROCEDURE — 99396 PREV VISIT EST AGE 40-64: CPT | Performed by: OBSTETRICS & GYNECOLOGY

## 2021-07-23 PROCEDURE — 82627 DEHYDROEPIANDROSTERONE: CPT | Performed by: OBSTETRICS & GYNECOLOGY

## 2021-07-23 PROCEDURE — 87340 HEPATITIS B SURFACE AG IA: CPT | Performed by: OBSTETRICS & GYNECOLOGY

## 2021-07-23 PROCEDURE — 83001 ASSAY OF GONADOTROPIN (FSH): CPT | Performed by: OBSTETRICS & GYNECOLOGY

## 2021-07-23 NOTE — PROGRESS NOTES
Subjective   Chief Complaint   Patient presents with   • Menstrual Problem     periods have been off, missing several months in-between periods   • Gynecologic Exam   • Abdominal Pain     hx of ectopic pregnancy, having a lot of abdominal pain and bloating in the evenings     Camila Sena is a 41 y.o. year old  presenting to be seen for her annual exam.     SEXUAL Hx:  She is currently sexually active.  In the past year there there has been ONE new sexual partner.    Condoms are always used.  She would like to be screened for STD's at today's exam.  Current birth control method: condoms.  She is happy with her current method of contraception and does not want to discuss alternative methods of contraception.  MENSTRUAL Hx:  Patient's last menstrual period was 2021 (exact date).  In the past 6 months her cycles have been unpredictable irregular.  She reports they have been occurring every 1-3 months. Her menstrual flow is typically normal.   Each month on average there are roughly 0 day(s) of very heavy flow.  Patient has taken pregnancy tests that are negative.   Intermenstrual bleeding is absent.    Post-coital bleeding is absent.  Dysmenorrhea: none  PMS: mild and is not affecting her activities of daily living  Her cycles ARE a source of concern for her that she wishes to discuss today.  HEALTH Hx:  She exercises regularly: no (and has no plans to become more active).  She wears her seat belt: yes.  She has concerns about domestic violence: no.  OTHER THINGS SHE WANTS TO DISCUSS TODAY:  Patient reports for the last 6 months she has been experiencing intermittent abdominal pain. She reports it is located mid-lower abdominal pain that she describes as cramping pain. She reports it occurs one to two times a month. She reports the episodes only last for a few minutes. Last bowel movement: this morning. She reports 1-2 bowel movement a day with normal consistency. Denies any nausea or vomiting.  "    The following portions of the patient's history were reviewed and updated as appropriate:problem list, current medications, allergies, past family history, past medical history, past social history and past surgical history.    Social History    Tobacco Use      Smoking status: Never Smoker      Smokeless tobacco: Never Used    Review of Systems  Constitutional POS: nothing reported    NEG: anorexia or night sweats   Genitourinary POS: nothing reported    NEG: dysuria or hematuria      Gastointestinal POS: nothing reported    NEG: bloating, change in bowel habits, melena or reflux symptoms   Integument POS: nothing reported    NEG: moles that are changing in size, shape, color or rashes   Breast POS: nothing reported    NEG: persistent breast lump, skin dimpling or nipple discharge        Objective   /88   Pulse 97   Resp 18   Ht 167.6 cm (65.98\")   Wt 94.1 kg (207 lb 6.4 oz)   LMP 06/23/2021 (Exact Date)   SpO2 100%   BMI 33.49 kg/m²     General:  well developed; well nourished  no acute distress   Skin:  No suspicious lesions seen   Thyroid: normal to inspection and palpation   Breasts:  Examined in supine position  Symmetric without masses or skin dimpling  Nipples normal without inversion, lesions or discharge  There are no palpable axillary nodes   Abdomen: soft, non-tender; no masses  no umbilical or inguinal hernias are present  no hepato-splenomegaly   Pelvis: Clinical staff was present for exam  External genitalia:  normal appearance of the external genitalia including Bartholin's and [place]'s glands.  :  urethral meatus normal;  Vaginal:  normal pink mucosa without prolapse or lesions.  Cervix:  normal appearance.  Uterus:  normal size, shape and consistency.  Adnexa:  normal bimanual exam of the adnexa.        Assessment   1. Normal GYN exam  2. 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 Irregular Menses  4. Lower Abdominal Pain  5. Routine STI screening     Plan   Pap and HPV " were done today.  If she does not receive the results of the Pap within 2 weeks  time, she was instructed to call to find out the results.  I explained to Camila that the recommendations for Pap smear interval in a low risk patient's has lengthened to 5 years time if both cytology and HPV testing were normal.  I encouraged her to be seen yearly for a full physical exam including breast and pelvic exam even during the off years when PAP's will not be performed.  She was encouraged to get yearly mammograms.  She should report any palpable breast lump(s) or skin changes regardless of mammographic findings.  I explained to Camila that notification regarding her mammogram results will come from the center performing the study.  Our office will not be routinely calling with mammogram results.  It is her responsibility to make sure that the results from the mammogram are communicated to her by the breast center.  If she has any questions about the results, she is welcome to call our office anytime.  The following tests were ordered today: DHEA-s, ESR, FSH, HCG, Hep C Ab, HIV, PRL, RPR, STD swabs for GC, chlamydia and trichimoniasis, total testosterone, TSH and 17-OHP and Hepatitis B..  It was explained to Camila that all lab test should be back within the one week after they are performed. She will be notified about the results, regardless of the findings. If she has not been contacted by the office within 2 weeks after the test has been performed, it is her responsibility to contact us to learn about her results.  1. Ultrasound needs to be done after her next menses for #3 and 4.  2. Encouraged healthy diet and exercise.  3. No prescription was given or electronically sent at today's visit  4. The importance of keeping all planned follow-up and taking all medications as prescribed was emphasized.  5. Follow up after ultrasound     No orders of the defined types were placed in this encounter.         This note was  electronically signed.    Lucy Hawley,   July 23, 2021    Note: Speech recognition transcription software may have been used to create portions of this document.  An attempt at proofreading has been made but errors in transcription could still be present

## 2021-07-24 LAB — DHEA-S SERPL-MCNC: 190 UG/DL (ref 57.3–279.2)

## 2021-07-28 LAB — 17OHP SERPL-MCNC: 164 NG/DL

## 2021-08-08 PROBLEM — Z01.419 WELL WOMAN EXAM: Status: ACTIVE | Noted: 2021-08-08

## 2021-08-08 PROBLEM — N87.0 MILD CERVICAL DYSPLASIA: Status: ACTIVE | Noted: 2021-08-08

## 2021-08-10 ENCOUNTER — OFFICE VISIT (OUTPATIENT)
Dept: OBSTETRICS AND GYNECOLOGY | Facility: CLINIC | Age: 41
End: 2021-08-10

## 2021-08-10 VITALS
HEIGHT: 66 IN | BODY MASS INDEX: 33.23 KG/M2 | DIASTOLIC BLOOD PRESSURE: 82 MMHG | SYSTOLIC BLOOD PRESSURE: 132 MMHG | WEIGHT: 206.8 LBS

## 2021-08-10 DIAGNOSIS — N93.9 ABNORMAL UTERINE BLEEDING (AUB): Primary | ICD-10-CM

## 2021-08-10 DIAGNOSIS — R87.610 ATYPICAL SQUAMOUS CELL CHANGES OF UNDETERMINED SIGNIFICANCE (ASCUS) ON CERVICAL CYTOLOGY WITH NEGATIVE HIGH RISK HUMAN PAPILLOMA VIRUS (HPV) TEST RESULT: ICD-10-CM

## 2021-08-10 PROCEDURE — 99213 OFFICE O/P EST LOW 20 MIN: CPT | Performed by: OBSTETRICS & GYNECOLOGY

## 2021-08-10 NOTE — PROGRESS NOTES
"Subjective   Chief Complaint   Patient presents with   • Follow-up     Fish pt. US review.      Camila Sena is a 41 y.o. year old  who comes to review her recent testing and discuss next steps regarding irregular menses and recent pap smear. Patient's last menstrual period was 2021 (exact date). Over the past 6 months she has had menses very 1-3 months - overall light flow.       OTHER THINGS SHE WANTS TO DISCUSS TODAY:  She has questions regarding whether she can have a hysterectomy given history of HPV and mild cervical dysplasia        Objective   /82   Ht 167.6 cm (66\")   Wt 93.8 kg (206 lb 12.8 oz)   LMP 2021 (Exact Date)   Breastfeeding No   BMI 33.38 kg/m²     Lab Review   AUB labs Dr. Hawley had ordered     Imaging   Pelvic ultrasound report       Assessment   1. AUB - anticipate anovulatory cycles   2. ASCUS pap, negative HPV with history of mild dysplasia last year     Plan   1. Recommended scheduling colposcopy per ASCCP guidelines   2. Reviewed hormonal contraception as option to help with #1 but she declines. Reviewed she needs to call us if she goes more than 3 months without a menstrual cycle.   3. Reviewed she can talk with Dr. Hawley regarding hysterectomy in the future but reviewed I do not think it is indicated at this time.  4. The importance of keeping all planned follow-up and taking all medications as prescribed was emphasized.  5. Schedule colposcopy with Dr. Hawley    No orders of the defined types were placed in this encounter.         Total time spent today with Camila  was 20-29 minutes (level 3).  Greater than 50% of the time was spent face-to-face coordinating care, answering her questions and counseling regarding pathophysiology of her presenting problem along with plans for any diagnositc work-up and treatment.    This note was electronically signed.    Davina Toledo MD  August 10, 2021    Note: Speech recognition transcription software may have " been used to create portions of this document.  An attempt at proofreading has been made but errors in transcription could still be present.

## 2021-08-24 ENCOUNTER — OFFICE VISIT (OUTPATIENT)
Dept: FAMILY MEDICINE CLINIC | Facility: CLINIC | Age: 41
End: 2021-08-24

## 2021-08-24 VITALS
DIASTOLIC BLOOD PRESSURE: 82 MMHG | OXYGEN SATURATION: 98 % | HEIGHT: 66 IN | BODY MASS INDEX: 33.43 KG/M2 | TEMPERATURE: 97.8 F | SYSTOLIC BLOOD PRESSURE: 122 MMHG | WEIGHT: 208 LBS | HEART RATE: 102 BPM | RESPIRATION RATE: 18 BRPM

## 2021-08-24 DIAGNOSIS — R05.9 COUGH: Primary | ICD-10-CM

## 2021-08-24 DIAGNOSIS — R06.02 SHORTNESS OF BREATH: ICD-10-CM

## 2021-08-24 DIAGNOSIS — R50.9 FEVER, UNSPECIFIED FEVER CAUSE: ICD-10-CM

## 2021-08-24 PROCEDURE — 99213 OFFICE O/P EST LOW 20 MIN: CPT | Performed by: PHYSICIAN ASSISTANT

## 2021-08-24 RX ORDER — PREDNISONE 20 MG/1
40 TABLET ORAL DAILY
Qty: 10 TABLET | Refills: 0 | Status: SHIPPED | OUTPATIENT
Start: 2021-08-24 | End: 2021-08-29

## 2021-08-24 RX ORDER — GUAIFENESIN/DEXTROMETHORPHAN 100-10MG/5
5 SYRUP ORAL 3 TIMES DAILY PRN
Qty: 118 ML | Refills: 1 | Status: SHIPPED | OUTPATIENT
Start: 2021-08-24 | End: 2022-03-07

## 2021-08-24 RX ORDER — AZITHROMYCIN 250 MG/1
TABLET, FILM COATED ORAL
Qty: 6 TABLET | Refills: 0 | Status: SHIPPED | OUTPATIENT
Start: 2021-08-24 | End: 2021-10-20

## 2021-08-24 NOTE — PROGRESS NOTES
Chief Complaint   Patient presents with   • Fever     x 2 days    • Cough   • Shortness of Breath       HPI      Camila Sena is a 41 y.o. female who presents for Fever (x 2 days ), Cough, and Shortness of Breath    Patient reports fever in the 100s, body aches, sore throat, cough with discolored sputum and shortness of breath that started 2 days ago.  She felt unwell last week and did have a Covid test that was negative.  Her symptoms resolved with symptomatic treatment but then she started to feel bad again 2 days ago.  She is taking Tylenol and ibuprofen for symptoms.  No antibiotic allergies.  She had a repeat Covid test at  this morning and should have results tomorrow.  She is unsure if she has been exposed.  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  She has been vaccinated with pfizer in March.    Past Medical History:   Diagnosis Date   • Asthma    • GERD (gastroesophageal reflux disease)    • History of BCG vaccination    • History of cervical dysplasia     Resolved w/o any treatment       Past Surgical History:   Procedure Laterality Date   •  SECTION     • LAPAROTOMY EXPLORATORY FOR ECTOPIC PREGNANCY      Side unknown   • LAPAROTOMY EXPLORATORY FOR ECTOPIC PREGNANCY      Side unknown   • SHOULDER SURGERY Right 2020    [facility]   • UPPER GASTROINTESTINAL ENDOSCOPY  2018       Family History   Problem Relation Age of Onset   • Hypertension Mother    • Migraines Father    • Breast cancer Neg Hx    • Ovarian cancer Neg Hx    • Uterine cancer Neg Hx    • Endometrial cancer Neg Hx    • Colon cancer Neg Hx        Social History     Socioeconomic History   • Marital status: Single     Spouse name: José Antonio   • Number of children: 1   • Years of education: H.S.   • Highest education level: High school graduate   Tobacco Use   • Smoking status: Never Smoker   • Smokeless tobacco: Never Used   Vaping Use   • Vaping Use: Never used   Substance and  "Sexual Activity   • Alcohol use: Not Currently   • Drug use: Never   • Sexual activity: Yes     Partners: Male     Birth control/protection: Condom       No Known Allergies    ROS    Review of Systems   Constitutional: Positive for chills, fatigue and fever.   HENT: Positive for congestion, rhinorrhea, sinus pressure and sore throat. Negative for ear pain.    Respiratory: Positive for cough and shortness of breath. Negative for wheezing.    Musculoskeletal: Positive for myalgias.       Vitals:    08/24/21 0924   BP: 122/82   Pulse: 102   Resp: 18   Temp: 97.8 °F (36.6 °C)   TempSrc: Infrared   SpO2: 98%   Weight: 94.3 kg (208 lb)   Height: 167.6 cm (66\")     Body mass index is 33.57 kg/m².    Current Outpatient Medications on File Prior to Visit   Medication Sig Dispense Refill   • amLODIPine (NORVASC) 5 MG tablet TOME SUPRIYA TABLETA SUPRIYA VEZ AL SARAH POR VIA ORAL 90 tablet 1   • fexofenadine (ALLEGRA) 180 MG tablet Take 1 tablet by mouth Daily. 90 tablet 0   • montelukast (SINGULAIR) 10 MG tablet Take 1 tablet by mouth Every Night. 90 tablet 0   • omeprazole (priLOSEC) 20 MG capsule Take 1 capsule by mouth Daily. 90 capsule 0   • albuterol sulfate  (90 Base) MCG/ACT inhaler Inhale 2 puffs Every 4 (Four) Hours As Needed for Wheezing or Shortness of Air. 1 inhaler 5   • escitalopram (LEXAPRO) 20 MG tablet TOME SUPRIYA TABLETA POR VIA ORAL CADA MANANA 90 tablet 1   • [DISCONTINUED] fexofenadine (ALLEGRA) 180 MG tablet TOME SUPRIYA TABLETA SUPRIYA VEZ AL SARAH POR VIA ORAL 90 tablet 0   • [DISCONTINUED] fluconazole (Diflucan) 150 MG tablet Take 1 tablet by mouth Daily. Repeat dose in 72 hours. 2 tablet 0   • [DISCONTINUED] montelukast (SINGULAIR) 10 MG tablet TOME SUPRIYA TABLETA POR VIA ORAL SUPRIYA VEZ CADA NOCHE 90 tablet 0   • [DISCONTINUED] omeprazole (priLOSEC) 20 MG capsule TOME SUPRIYA CAPSULA SUPRIYA VEZ AL SARAH POR VIA ORAL 90 capsule 0     No current facility-administered medications on file prior to visit.       Results for orders " placed or performed in visit on 07/23/21   TSH Rfx On Abnormal To Free T4    Specimen: Blood   Result Value Ref Range    TSH 3.890 0.270 - 4.200 uIU/mL       PE    Physical Exam  Vitals reviewed.   Constitutional:       General: She is not in acute distress.     Appearance: Normal appearance. She is well-developed and normal weight. She is ill-appearing. She is not diaphoretic.   HENT:      Head: Normocephalic and atraumatic.      Mouth/Throat:      Pharynx: Uvula midline. Posterior oropharyngeal erythema present. No oropharyngeal exudate.   Eyes:      Extraocular Movements: Extraocular movements intact.      Conjunctiva/sclera: Conjunctivae normal.   Cardiovascular:      Rate and Rhythm: Regular rhythm. Tachycardia present.      Heart sounds: Normal heart sounds.   Pulmonary:      Effort: Pulmonary effort is normal.      Breath sounds: Decreased breath sounds present.      Comments: Rhonchi cough  Musculoskeletal:         General: Normal range of motion.      Cervical back: Normal range of motion.      Right lower leg: No edema.      Left lower leg: No edema.   Skin:     General: Skin is warm.      Findings: No erythema or rash.   Neurological:      General: No focal deficit present.      Mental Status: She is alert.   Psychiatric:         Attention and Perception: She is attentive.         Mood and Affect: Mood normal.         Speech: Speech normal.         Behavior: Behavior normal. Behavior is cooperative.         Thought Content: Thought content normal.         Judgment: Judgment normal.          A/P    Diagnoses and all orders for this visit:    1. Cough (Primary)  -     predniSONE (DELTASONE) 20 MG tablet; Take 2 tablets by mouth Daily for 5 days.  Dispense: 10 tablet; Refill: 0  -     azithromycin (Zithromax Z-Jatinder) 250 MG tablet; Take 2 tablets by mouth on day 1, then 1 tablet daily on days 2-5  Dispense: 6 tablet; Refill: 0  -     guaiFENesin-dextromethorphan (ROBITUSSIN DM) 100-10 MG/5ML syrup; Take 5 mL  by mouth 3 (Three) Times a Day As Needed for Cough or Congestion.  Dispense: 118 mL; Refill: 1  Pending Covid test from [facility].  Had this morning.  Discussed assuming she has Covid at this time given symptoms.  Will start prednisone and azithromycin.  Cough syrup prn.  Continue tylenol and ibuprofen prn.  Recommend purchasing pulse oximeter and monitoring oxygen level at home.  Work note provided.    2. Shortness of breath  Pulse ox is 98% on room air.  Has albuterol inhaler at home that helps when she feels worsening SOB.    3. Fever, unspecified fever cause  Has had fever in the 100s+ over the last 2 days.  Normal today in office.  Managing with tylenol and ibuprofen.       Plan of care reviewed with patient at the conclusion of today's visit. Education was provided regarding diagnosis, management and any prescribed or recommended OTC medications.  Patient verbalizes understanding of and agreement with management plan.    No follow-ups on file.     Zaida De Jesus PA-C

## 2021-08-24 NOTE — PATIENT INSTRUCTIONS
Alternate ibuprofen 600 mg and tylenol 1000 mg throughout the day to control body aches and tylenol.    Purchase pulse oximeter.  Monitor at rest and with walking/activity.  Go to ER if pulse oximeter reads less than 90% oxygen.

## 2021-10-18 DIAGNOSIS — I10 ESSENTIAL HYPERTENSION: ICD-10-CM

## 2021-10-18 RX ORDER — AMLODIPINE BESYLATE 5 MG/1
TABLET ORAL
Qty: 60 TABLET | Refills: 0 | Status: SHIPPED | OUTPATIENT
Start: 2021-10-18 | End: 2021-10-20 | Stop reason: SDUPTHER

## 2021-10-20 ENCOUNTER — TELEMEDICINE (OUTPATIENT)
Dept: FAMILY MEDICINE CLINIC | Facility: TELEHEALTH | Age: 41
End: 2021-10-20

## 2021-10-20 VITALS — HEIGHT: 66 IN | WEIGHT: 208 LBS | BODY MASS INDEX: 33.43 KG/M2

## 2021-10-20 DIAGNOSIS — J30.2 SEASONAL ALLERGIES: ICD-10-CM

## 2021-10-20 DIAGNOSIS — J40 BRONCHITIS: Primary | ICD-10-CM

## 2021-10-20 DIAGNOSIS — K21.00 GASTROESOPHAGEAL REFLUX DISEASE WITH ESOPHAGITIS: ICD-10-CM

## 2021-10-20 DIAGNOSIS — I10 ESSENTIAL HYPERTENSION: ICD-10-CM

## 2021-10-20 PROCEDURE — 99213 OFFICE O/P EST LOW 20 MIN: CPT | Performed by: NURSE PRACTITIONER

## 2021-10-20 RX ORDER — DEXTROMETHORPHAN HYDROBROMIDE AND PROMETHAZINE HYDROCHLORIDE 15; 6.25 MG/5ML; MG/5ML
5 SYRUP ORAL NIGHTLY PRN
Qty: 118 ML | Refills: 0 | Status: SHIPPED | OUTPATIENT
Start: 2021-10-20 | End: 2022-03-07

## 2021-10-20 RX ORDER — AMLODIPINE BESYLATE 5 MG/1
5 TABLET ORAL DAILY
Qty: 90 TABLET | Refills: 0 | Status: SHIPPED | OUTPATIENT
Start: 2021-10-20 | End: 2022-03-07 | Stop reason: SDUPTHER

## 2021-10-20 RX ORDER — MONTELUKAST SODIUM 10 MG/1
10 TABLET ORAL NIGHTLY
Qty: 90 TABLET | Refills: 0 | Status: SHIPPED | OUTPATIENT
Start: 2021-10-20 | End: 2022-01-15

## 2021-10-20 RX ORDER — PREDNISONE 20 MG/1
TABLET ORAL
Qty: 13 TABLET | Refills: 0 | Status: SHIPPED | OUTPATIENT
Start: 2021-10-20 | End: 2022-01-04

## 2021-10-20 RX ORDER — AZITHROMYCIN 250 MG/1
TABLET, FILM COATED ORAL
Qty: 6 TABLET | Refills: 0 | Status: SHIPPED | OUTPATIENT
Start: 2021-10-20 | End: 2022-01-04

## 2021-10-20 RX ORDER — OMEPRAZOLE 20 MG/1
20 CAPSULE, DELAYED RELEASE ORAL DAILY
Qty: 90 CAPSULE | Refills: 0 | Status: SHIPPED | OUTPATIENT
Start: 2021-10-20 | End: 2022-08-19 | Stop reason: SDUPTHER

## 2021-10-20 NOTE — PROGRESS NOTES
You have chosen to receive care through a telehealth visit.  Do you consent to use a video/audio connection for your medical care today? Yes     CHIEF COMPLAINT  Cc: cough    HPI  Camila Sena is a 41 y.o. female  presents with complaint of a cough. Symptoms include; cough, chills, fatigue, fever, chest tightness, diarrhea, and muscle aches from coughing. She reports that she had a  COVID-19 test done at the Urgent Care today and that the results were negative, She is fully vaccinated via the Pfizer vaccine. She has had her symptoms for 6 days. She has been taking theraflu for her symptoms    Review of Systems   Constitutional: Positive for chills, fatigue (at times) and fever (low grade fever last night).   HENT: Negative for congestion. Sore throat: resolved.         No loss of taste and smell   Respiratory: Positive for cough and chest tightness. Negative for shortness of breath and wheezing.    Cardiovascular: Negative for chest pain.   Gastrointestinal: Positive for diarrhea. Negative for vomiting. Nausea: reolved.   Musculoskeletal: Positive for myalgias.   Neurological: Headaches: resolved.       Past Medical History:   Diagnosis Date   • Asthma    • GERD (gastroesophageal reflux disease) 2016   • History of BCG vaccination    • History of cervical dysplasia 2010    Resolved w/o any treatment       Family History   Problem Relation Age of Onset   • Hypertension Mother    • Migraines Father    • Breast cancer Neg Hx    • Ovarian cancer Neg Hx    • Uterine cancer Neg Hx    • Endometrial cancer Neg Hx    • Colon cancer Neg Hx        Social History     Socioeconomic History   • Marital status: Single     Spouse name: José Antonio   • Number of children: 1   • Years of education: H.S.   • Highest education level: High school graduate   Tobacco Use   • Smoking status: Never Smoker   • Smokeless tobacco: Never Used   Vaping Use   • Vaping Use: Never used   Substance and Sexual Activity   • Alcohol use: Not  "Currently   • Drug use: Never   • Sexual activity: Yes     Partners: Male     Birth control/protection: Condom         Ht 167.6 cm (66\")   Wt 94.3 kg (208 lb)   BMI 33.57 kg/m²     PHYSICAL EXAM  Physical Exam   Constitutional: She is oriented to person, place, and time. She appears well-developed and well-nourished.   HENT:   Head: Normocephalic and atraumatic.   Right Ear: Hearing and external ear normal.   Left Ear: Hearing and external ear normal.   Nose: Nose normal.   Mouth/Throat: Mouth/Lips are normal.  Eyes: Lids are normal. Right eye exhibits no discharge and no exudate. Left eye exhibits no discharge and no exudate. Right conjunctiva is not injected. Left conjunctiva is not injected.   Pulmonary/Chest: No accessory muscle usage. No tachypnea and no bradypnea.  No respiratory distress (dry, tight cough intermittently t/o visit).No use of oxygen by nasal cannulaNo use of oxygen by mask noted.  Abdominal: Abdomen appears normal.   Neurological: She is alert and oriented to person, place, and time. No cranial nerve deficit.   Skin: Her skin appears normal.  Psychiatric: She has a normal mood and affect. Her speech is normal and behavior is normal. Judgment and thought content normal.       Results for orders placed or performed in visit on 07/23/21   TSH Rfx On Abnormal To Free T4    Specimen: Blood   Result Value Ref Range    TSH 3.890 0.270 - 4.200 uIU/mL       Diagnoses and all orders for this visit:    1. Bronchitis (Primary)    Other orders  -     promethazine-dextromethorphan (PROMETHAZINE-DM) 6.25-15 MG/5ML syrup; Take 5 mL by mouth At Night As Needed for Cough.  Dispense: 118 mL; Refill: 0  -     predniSONE (DELTASONE) 20 MG tablet; Prednisone 20mg tabs, 3 for 2 days, 2 for 2 days, 1 for 2 days, 1/2 for 2 days take with food or milk  Dispense: 13 tablet; Refill: 0  -     azithromycin (Zithromax) 250 MG tablet; Take 2 tablets the first day, then 1 tablet daily for 4 days.  Dispense: 6 tablet; Refill: " 0    Probiotics for two weeks related to taking antibiotics. The pharmacist can help you with this if needed. Do not take within two hours of antibiotic.  Take prednisone with food as early in the day as possible  Do not take prednisone with nsaids such as ibuprofen, aleve, or aspirin  May take tylenol for pain or fever  Do not drive after taking promethazine DM as it may make you drowsy    FOLLOW-UP  If symptoms worsen or persist follow up with PCP or Urgent Care for in person evaluation    Patient verbalizes understanding of medication dosage, comfort measures, instructions for treatment and follow-up.    Brooke Camejo, GEORGIE  10/20/2021  17:10 EDT    This visit was performed via Telehealth.  This patient has been instructed to follow-up with their primary care provider if their symptoms worsen or the treatment provided does not resolve their illness.

## 2021-10-20 NOTE — PATIENT INSTRUCTIONS
Acute Bronchitis, Adult    Acute bronchitis is sudden or acute swelling of the air tubes (bronchi) in the lungs. Acute bronchitis causes these tubes to fill with mucus, which can make it hard to breathe. It can also cause coughing or wheezing.  In adults, acute bronchitis usually goes away within 2 weeks. A cough caused by bronchitis may last up to 3 weeks. Smoking, allergies, and asthma can make the condition worse.  What are the causes?  This condition can be caused by germs and by substances that irritate the lungs, including:  · Cold and flu viruses. The most common cause of this condition is the virus that causes the common cold.  · Bacteria.  · Substances that irritate the lungs, including:  ? Smoke from cigarettes and other forms of tobacco.  ? Dust and pollen.  ? Fumes from chemical products, gases, or burned fuel.  ? Other materials that pollute indoor or outdoor air.  · Close contact with someone who has acute bronchitis.  What increases the risk?  The following factors may make you more likely to develop this condition:  · A weak body's defense system, also called the immune system.  · A condition that affects your lungs and breathing, such as asthma.  What are the signs or symptoms?  Common symptoms of this condition include:  · Lung and breathing problems, such as:  ? Coughing. This may bring up clear, yellow, or green mucus from your lungs (sputum).  ? Wheezing.  ? Having too much mucus in your lungs (chest congestion).  ? Having shortness of breath.  · A fever.  · Chills.  · Aches and pains, including:  ? Tightness in your chest and other body aches.  ? A sore throat.  How is this diagnosed?  This condition is usually diagnosed based on:  · Your symptoms and medical history.  · A physical exam.  You may also have other tests, including tests to rule out other conditions, such pneumonia. These tests include:  · A test of lung function.  · Test of a mucus sample to look for the presence of  bacteria.  · Tests to check the oxygen level in your blood.  · Blood tests.  · Chest X-ray.  How is this treated?  Most cases of acute bronchitis clear up over time without treatment. Your health care provider may recommend:  · Drinking more fluids. This can thin your mucus, which may improve your breathing.  · Taking a medicine for a fever or cough.  · Using a device that gets medicine into your lungs (inhaler) to help improve breathing and control coughing.  · Using a vaporizer or a humidifier. These are machines that add water to the air to help you breathe better.  Follow these instructions at home:  Activity  · Get plenty of rest.  · Return to your normal activities as told by your health care provider. Ask your health care provider what activities are safe for you.  Lifestyle  · Drink enough fluid to keep your urine pale yellow.  · Do not drink alcohol.  · Do not use any products that contain nicotine or tobacco, such as cigarettes, e-cigarettes, and chewing tobacco. If you need help quitting, ask your health care provider. Be aware that:  ? Your bronchitis will get worse if you smoke or breathe in other people's smoke (secondhand smoke).  ? Your lungs will heal faster if you quit smoking.  General instructions    · Take over-the-counter and prescription medicines only as told by your health care provider.  · Use an inhaler, vaporizer, or humidifier as told by your health care provider.  · If you have a sore throat, gargle with a salt-water mixture 3-4 times a day or as needed. To make a salt-water mixture, completely dissolve ½-1 tsp (3-6 g) of salt in 1 cup (237 mL) of warm water.  · Keep all follow-up visits as told by your health care provider. This is important.    How is this prevented?  To lower your risk of getting this condition again:  · Wash your hands often with soap and water. If soap and water are not available, use hand .  · Avoid contact with people who have cold symptoms.  · Try not  to touch your mouth, nose, or eyes with your hands.  · Avoid places where there are fumes from chemicals. Breathing these fumes will make your condition worse.  · Get the flu shot every year.  Contact a health care provider if:  · Your symptoms do not improve after 2 weeks of treatment.  · You vomit more than once or twice.  · You have symptoms of dehydration such as:  ? Dark urine.  ? Dry skin or eyes.  ? Increased thirst.  ? Headaches.  ? Confusion.  ? Muscle cramps.  Get help right away if you:  · Cough up blood.  · Feel pain in your chest.  · Have severe shortness of breath.  · Faint or keep feeling like you are going to faint.  · Have a severe headache.  · Have fever or chills that get worse.  These symptoms may represent a serious problem that is an emergency. Do not wait to see if the symptoms will go away. Get medical help right away. Call your local emergency services (911 in the [place] Do not drive yourself to the hospital.  Summary  · Acute bronchitis is sudden (acute) inflammation of the air tubes (bronchi) between the windpipe and the lungs. In adults, acute bronchitis usually goes away within 2 weeks, although coughing may last 3 weeks or longer  · Take over-the-counter and prescription medicines only as told by your health care provider.  · Drink enough fluid to keep your urine pale yellow.  · Contact a health care provider if your symptoms do not improve after 2 weeks of treatment.  · Get help right away if you cough up blood, faint, or have chest pain or shortness of breath.  This information is not intended to replace advice given to you by your health care provider. Make sure you discuss any questions you have with your health care provider.  Document Revised: 08/31/2020 Document Reviewed: 07/10/2020  [employer] Patient Education © 2021 [employer]

## 2021-12-09 DIAGNOSIS — F41.9 ANXIETY: ICD-10-CM

## 2021-12-10 DIAGNOSIS — J45.20 MILD INTERMITTENT ASTHMA WITHOUT COMPLICATION: ICD-10-CM

## 2021-12-10 RX ORDER — ESCITALOPRAM OXALATE 20 MG/1
20 TABLET ORAL DAILY
Qty: 90 TABLET | Refills: 1 | Status: SHIPPED | OUTPATIENT
Start: 2021-12-10 | End: 2022-03-07 | Stop reason: SDUPTHER

## 2021-12-10 RX ORDER — ALBUTEROL SULFATE 90 UG/1
2 AEROSOL, METERED RESPIRATORY (INHALATION) EVERY 4 HOURS PRN
Qty: 18 G | Refills: 1 | Status: SHIPPED | OUTPATIENT
Start: 2021-12-10 | End: 2022-12-22 | Stop reason: SDUPTHER

## 2021-12-10 NOTE — TELEPHONE ENCOUNTER
Rx Refill Note  Requested Prescriptions     Pending Prescriptions Disp Refills   • escitalopram (LEXAPRO) 20 MG tablet 90 tablet 1      Last office visit with prescribing clinician: 8/24/2021      Next office visit with prescribing clinician: 12/10/2021   23}  Chelly Hutchinson MA  12/10/21, 08:12 EST     Last fill: 06/01/2021

## 2021-12-10 NOTE — TELEPHONE ENCOUNTER
Rx Refill Note  Requested Prescriptions     Pending Prescriptions Disp Refills   • albuterol sulfate  (90 Base) MCG/ACT inhaler       Sig: Inhale 2 puffs Every 4 (Four) Hours As Needed for Wheezing or Shortness of Air.      Last office visit with prescribing clinician: 8/24/2021      Next office visit with prescribing clinician: 3/7/2022   }  Chelly Hutchinson MA  12/10/21, 08:12 EST     Last fill: 02/07/2020

## 2022-01-04 ENCOUNTER — OFFICE VISIT (OUTPATIENT)
Dept: FAMILY MEDICINE CLINIC | Facility: CLINIC | Age: 42
End: 2022-01-04

## 2022-01-04 VITALS
WEIGHT: 210.8 LBS | SYSTOLIC BLOOD PRESSURE: 124 MMHG | OXYGEN SATURATION: 96 % | DIASTOLIC BLOOD PRESSURE: 74 MMHG | HEART RATE: 84 BPM | HEIGHT: 66 IN | BODY MASS INDEX: 33.88 KG/M2 | TEMPERATURE: 97.8 F

## 2022-01-04 DIAGNOSIS — R06.09 DYSPNEA ON EXERTION: ICD-10-CM

## 2022-01-04 DIAGNOSIS — Z23 FLU VACCINE NEED: ICD-10-CM

## 2022-01-04 DIAGNOSIS — J45.40 MODERATE PERSISTENT ASTHMA WITHOUT COMPLICATION: Primary | ICD-10-CM

## 2022-01-04 PROCEDURE — 90471 IMMUNIZATION ADMIN: CPT | Performed by: PHYSICIAN ASSISTANT

## 2022-01-04 PROCEDURE — 90686 IIV4 VACC NO PRSV 0.5 ML IM: CPT | Performed by: PHYSICIAN ASSISTANT

## 2022-01-04 PROCEDURE — 99213 OFFICE O/P EST LOW 20 MIN: CPT | Performed by: PHYSICIAN ASSISTANT

## 2022-01-04 NOTE — PROGRESS NOTES
Chief Complaint   Patient presents with   • Asthma   • Shortness of Breath     Pt. states when sitting and walking   • Wheezing   • Cough       HPI      Camila Sena is a 41 y.o. female who presents for Asthma, Shortness of Breath (Pt. states when sitting and walking), Wheezing, and Cough.  Patient presents with ongoing daily wheezing.  She reports some mild dyspnea with activity and walking.  She is using albuterol inhaler and this helps when she uses it.  Not currently using a daily long-acting inhaler.  Denies productive cough or worsening shortness of breath.  No fever or chills.  Compliant on allegra and montelukast.      Past Medical History:   Diagnosis Date   • Asthma    • GERD (gastroesophageal reflux disease)    • History of BCG vaccination    • History of cervical dysplasia     Resolved w/o any treatment       Past Surgical History:   Procedure Laterality Date   •  SECTION     • LAPAROTOMY EXPLORATORY FOR ECTOPIC PREGNANCY      Side unknown   • LAPAROTOMY EXPLORATORY FOR ECTOPIC PREGNANCY      Side unknown   • SHOULDER SURGERY Right 2020    [facility]   • UPPER GASTROINTESTINAL ENDOSCOPY  2018       Family History   Problem Relation Age of Onset   • Hypertension Mother    • Migraines Father    • Breast cancer Neg Hx    • Ovarian cancer Neg Hx    • Uterine cancer Neg Hx    • Endometrial cancer Neg Hx    • Colon cancer Neg Hx        Social History     Socioeconomic History   • Marital status: Single     Spouse name: José Antonio   • Number of children: 1   • Years of education: H.S.   • Highest education level: High school graduate   Tobacco Use   • Smoking status: Never Smoker   • Smokeless tobacco: Never Used   Vaping Use   • Vaping Use: Never used   Substance and Sexual Activity   • Alcohol use: Not Currently   • Drug use: Never   • Sexual activity: Yes     Partners: Male     Birth control/protection: Condom       No Known Allergies    ROS    Review  "of Systems   Constitutional: Negative for chills and fever.   HENT: Negative for congestion, ear pain, rhinorrhea, sinus pressure and sore throat.    Respiratory: Positive for cough, shortness of breath and wheezing. Negative for chest tightness.    Cardiovascular: Negative for chest pain, palpitations and leg swelling.   Neurological: Negative for dizziness, light-headedness and headache.       Vitals:    01/04/22 0843   BP: 124/74   BP Location: Left arm   Patient Position: Sitting   Cuff Size: Adult   Pulse: 84   Temp: 97.8 °F (36.6 °C)   SpO2: 96%   Weight: 95.6 kg (210 lb 12.8 oz)   Height: 167.6 cm (65.98\")   PainSc: 0-No pain     Body mass index is 34.04 kg/m².    Current Outpatient Medications on File Prior to Visit   Medication Sig Dispense Refill   • albuterol sulfate  (90 Base) MCG/ACT inhaler Inhale 2 puffs Every 4 (Four) Hours As Needed for Wheezing or Shortness of Air. 18 g 1   • amLODIPine (NORVASC) 5 MG tablet Take 1 tablet by mouth Daily. 90 tablet 0   • escitalopram (LEXAPRO) 20 MG tablet Take 1 tablet by mouth Daily. 90 tablet 1   • fexofenadine (ALLEGRA) 180 MG tablet Take 1 tablet by mouth Daily. 90 tablet 0   • guaiFENesin-dextromethorphan (ROBITUSSIN DM) 100-10 MG/5ML syrup Take 5 mL by mouth 3 (Three) Times a Day As Needed for Cough or Congestion. 118 mL 1   • montelukast (SINGULAIR) 10 MG tablet Take 1 tablet by mouth Every Night. 90 tablet 0   • omeprazole (priLOSEC) 20 MG capsule Take 1 capsule by mouth Daily. 90 capsule 0   • promethazine-dextromethorphan (PROMETHAZINE-DM) 6.25-15 MG/5ML syrup Take 5 mL by mouth At Night As Needed for Cough. 118 mL 0     No current facility-administered medications on file prior to visit.       Results for orders placed or performed in visit on 07/23/21   TSH Rfx On Abnormal To Free T4    Specimen: Blood   Result Value Ref Range    TSH 3.890 0.270 - 4.200 uIU/mL       PE    Physical Exam  Vitals reviewed.   Constitutional:       General: She is not " in acute distress.     Appearance: She is well-developed. She is not ill-appearing or diaphoretic.   HENT:      Head: Normocephalic and atraumatic.      Right Ear: Hearing, tympanic membrane, ear canal and external ear normal.      Left Ear: Hearing, tympanic membrane, ear canal and external ear normal.      Nose: Nose normal.      Right Sinus: No maxillary sinus tenderness or frontal sinus tenderness.      Left Sinus: No maxillary sinus tenderness or frontal sinus tenderness.      Mouth/Throat:      Pharynx: Uvula midline. No posterior oropharyngeal erythema.   Eyes:      Conjunctiva/sclera: Conjunctivae normal.   Cardiovascular:      Rate and Rhythm: Normal rate and regular rhythm.      Heart sounds: Normal heart sounds. No murmur heard.  No friction rub. No gallop.    Pulmonary:      Effort: Pulmonary effort is normal. No respiratory distress.      Breath sounds: Wheezing present.   Musculoskeletal:         General: Normal range of motion.      Cervical back: Normal range of motion.   Skin:     General: Skin is warm.      Findings: No erythema.   Neurological:      Mental Status: She is alert and oriented to person, place, and time.   Psychiatric:         Behavior: Behavior normal.         Thought Content: Thought content normal.         Judgment: Judgment normal.          A/P    Diagnoses and all orders for this visit:    1. Moderate persistent asthma without complication (Primary)  -     Tiotropium Bromide Monohydrate (SPIRIVA RESPIMAT) 1.25 MCG/ACT aerosol solution inhaler; Inhale 2 puffs Daily.  Dispense: 4 g; Refill: 5  Trial of spiriva 2 puffs in the AM every day with albuterol prn.  Gave samples of medication and showed her how to use it.  Continue on allegra and montelukast.    2. Dyspnea on exertion  Most likely related to asthma.  Wheezing appreciated on exam.  Pulse ox is 96%.  No other symptoms.  No leg swelling or smothering when lying flat.  Call if dyspnea gets worse or is not improved with daily  inhaler.    3. Flu vaccine need  -     FluLaval/Fluarix/Fluzone >6 Months ([id number])         Plan of care reviewed with patient at the conclusion of today's visit. Education was provided regarding diagnosis, management and any prescribed or recommended OTC medications.  Patient verbalizes understanding of and agreement with management plan.    Return in about 4 weeks (around 2/1/2022).     Zaida De Jesus PA-C

## 2022-01-15 DIAGNOSIS — J30.2 SEASONAL ALLERGIES: ICD-10-CM

## 2022-01-15 RX ORDER — FEXOFENADINE HCL 180 MG/1
TABLET ORAL
Qty: 90 TABLET | Refills: 0 | Status: SHIPPED | OUTPATIENT
Start: 2022-01-15 | End: 2022-03-07 | Stop reason: SDUPTHER

## 2022-01-15 RX ORDER — MONTELUKAST SODIUM 10 MG/1
TABLET ORAL
Qty: 90 TABLET | Refills: 0 | Status: SHIPPED | OUTPATIENT
Start: 2022-01-15 | End: 2022-03-07 | Stop reason: SDUPTHER

## 2022-01-15 NOTE — TELEPHONE ENCOUNTER
Rx Refill Note  Requested Prescriptions     Pending Prescriptions Disp Refills   • montelukast (SINGULAIR) 10 MG tablet [Pharmacy Med Name: MONTELUKAST SOD 10 MG TABLET] 90 tablet 0     Sig: TOME SUPRIYA TABLETA POR VIA ORAL SUPRIYA VEZ CADA NOCHE   • fexofenadine (ALLEGRA) 180 MG tablet [Pharmacy Med Name: FEXOFENADINE  MG TABLET] 90 tablet 0     Sig: TOME SUPRIYA TABLETA SUPRIYA VEZ AL SARAH POR VIA ORAL      Last office visit with prescribing clinician: 1/4/2022    Next office visit with prescribing clinician: 2/1/2022            John Marcum MA  01/15/22, 12:27 EST

## 2022-01-20 ENCOUNTER — TELEMEDICINE (OUTPATIENT)
Dept: FAMILY MEDICINE CLINIC | Facility: CLINIC | Age: 42
End: 2022-01-20

## 2022-01-20 ENCOUNTER — TELEPHONE (OUTPATIENT)
Dept: FAMILY MEDICINE CLINIC | Facility: CLINIC | Age: 42
End: 2022-01-20

## 2022-01-20 DIAGNOSIS — R06.02 SHORTNESS OF BREATH: ICD-10-CM

## 2022-01-20 DIAGNOSIS — R05.9 COUGH: ICD-10-CM

## 2022-01-20 DIAGNOSIS — J45.20 MILD INTERMITTENT ASTHMA WITHOUT COMPLICATION: ICD-10-CM

## 2022-01-20 DIAGNOSIS — Z20.822 SUSPECTED COVID-19 VIRUS INFECTION: Primary | ICD-10-CM

## 2022-01-20 PROCEDURE — 99213 OFFICE O/P EST LOW 20 MIN: CPT | Performed by: PHYSICIAN ASSISTANT

## 2022-01-20 RX ORDER — PREDNISONE 20 MG/1
20 TABLET ORAL DAILY
Qty: 5 TABLET | Refills: 0 | Status: SHIPPED | OUTPATIENT
Start: 2022-01-20 | End: 2022-01-25

## 2022-01-20 NOTE — TELEPHONE ENCOUNTER
Caller: Camila Sena    Relationship to patient: Self    Best call back number: [phone]    Date of exposure: NA    Date of positive COVID19 test: 1/19/22 HOME TEST, WAITING FOR [facility]S TEST RESULTS AS OF 1/19/22    Date if possible COVID19 exposure: NA    COVID19 symptoms: COUGH, SORE THROAT, SHORTNESS OF BREATH    Date of initial quarantine: NA    Additional information or concerns: PATIENT STATED SHE HAS BEEN FEELING THIS WAY SINCE Friday 1/17/22.  EXPOSURE FROM FRIENDS ON 1/17/22    PATIENT STATED BECAUSE OF HER ASTHMA SHE WOULD LIKE ADVICE FROM DOCTOR MCKAYLA    What is the patients preferred pharmacy: [facility] [address] [facility] [phone] [facility] [phone]   [phone]

## 2022-01-20 NOTE — PROGRESS NOTES
Chief Complaint   Patient presents with   • Suspected Covid-19 Infection       HPI      Camila Sena is a 42 y.o. female who presents for Suspected Covid-19 Infection.  Patient reports sore throat, head congestion, shortness of air, cough that started on 2022.  Her sore throat has resolved.  She was diagnosed with strep throat and treated with antibiotics.  She is waiting for her Covid-19 test results, was tested on .  She is using inhalers and this is helping with her shortness of breath.  Cough is dry, non-productive.  Has asthma.  Was fully vaccinated for Covid.      Past Medical History:   Diagnosis Date   • Asthma    • GERD (gastroesophageal reflux disease)    • History of BCG vaccination    • History of cervical dysplasia     Resolved w/o any treatment       Past Surgical History:   Procedure Laterality Date   •  SECTION     • LAPAROTOMY EXPLORATORY FOR ECTOPIC PREGNANCY      Side unknown   • LAPAROTOMY EXPLORATORY FOR ECTOPIC PREGNANCY      Side unknown   • SHOULDER SURGERY Right 2020    [facility]   • UPPER GASTROINTESTINAL ENDOSCOPY  2018       Family History   Problem Relation Age of Onset   • Hypertension Mother    • Migraines Father    • Breast cancer Neg Hx    • Ovarian cancer Neg Hx    • Uterine cancer Neg Hx    • Endometrial cancer Neg Hx    • Colon cancer Neg Hx        Social History     Socioeconomic History   • Marital status: Single     Spouse name: José Antonio   • Number of children: 1   • Years of education: H.S.   • Highest education level: High school graduate   Tobacco Use   • Smoking status: Never Smoker   • Smokeless tobacco: Never Used   Vaping Use   • Vaping Use: Never used   Substance and Sexual Activity   • Alcohol use: Not Currently   • Drug use: Never   • Sexual activity: Yes     Partners: Male     Birth control/protection: Condom       No Known Allergies    ROS    Review of Systems   Constitutional: Positive for  fatigue. Negative for chills and fever.   HENT: Positive for congestion, rhinorrhea, sinus pressure and sore throat. Negative for ear pain.    Respiratory: Positive for cough, shortness of breath and wheezing.    Musculoskeletal: Negative for myalgias.   Neurological: Negative for dizziness and headache.       There were no vitals filed for this visit.  There is no height or weight on file to calculate BMI.    Current Outpatient Medications on File Prior to Visit   Medication Sig Dispense Refill   • albuterol sulfate  (90 Base) MCG/ACT inhaler Inhale 2 puffs Every 4 (Four) Hours As Needed for Wheezing or Shortness of Air. 18 g 1   • amLODIPine (NORVASC) 5 MG tablet Take 1 tablet by mouth Daily. 90 tablet 0   • escitalopram (LEXAPRO) 20 MG tablet Take 1 tablet by mouth Daily. 90 tablet 1   • fexofenadine (ALLEGRA) 180 MG tablet TOME SUPRIYA TABLETA SUPRIYA VEZ AL SARAH POR VIA ORAL 90 tablet 0   • guaiFENesin-dextromethorphan (ROBITUSSIN DM) 100-10 MG/5ML syrup Take 5 mL by mouth 3 (Three) Times a Day As Needed for Cough or Congestion. 118 mL 1   • montelukast (SINGULAIR) 10 MG tablet TOME SUPRIYA TABLETA POR VIA ORAL SUPRIYA VEZ CADA NOCHE 90 tablet 0   • omeprazole (priLOSEC) 20 MG capsule Take 1 capsule by mouth Daily. 90 capsule 0   • promethazine-dextromethorphan (PROMETHAZINE-DM) 6.25-15 MG/5ML syrup Take 5 mL by mouth At Night As Needed for Cough. 118 mL 0   • Tiotropium Bromide Monohydrate (SPIRIVA RESPIMAT) 1.25 MCG/ACT aerosol solution inhaler Inhale 2 puffs Daily. 4 g 5     No current facility-administered medications on file prior to visit.       Results for orders placed or performed in visit on 07/23/21   TSH Rfx On Abnormal To Free T4    Specimen: Blood   Result Value Ref Range    TSH 3.890 0.270 - 4.200 uIU/mL       PE    Physical Exam  Vitals reviewed.   Constitutional:       General: She is not in acute distress.     Appearance: Normal appearance. She is well-developed. She is ill-appearing. She is not  diaphoretic.   HENT:      Head: Normocephalic and atraumatic.   Eyes:      Extraocular Movements: Extraocular movements intact.      Conjunctiva/sclera: Conjunctivae normal.   Pulmonary:      Effort: No respiratory distress.   Musculoskeletal:         General: Normal range of motion.      Cervical back: Normal range of motion.   Neurological:      General: No focal deficit present.      Mental Status: She is alert.   Psychiatric:         Attention and Perception: She is attentive.         Mood and Affect: Mood normal.         Speech: Speech normal.         Behavior: Behavior normal. Behavior is cooperative.         Thought Content: Thought content normal.         Judgment: Judgment normal.          A/P    Diagnoses and all orders for this visit:    1. Suspected COVID-19 virus infection (Primary)  -     predniSONE (DELTASONE) 20 MG tablet; Take 1 tablet by mouth Daily for 5 days.  Dispense: 5 tablet; Refill: 0  Pending Covid test from [facility].  Test was completed on 01/19.    2. Cough  Dry cough.  Recently completed antibiotics for strep throat.    3. Mild intermittent asthma without complication  4. Shortness of breath  -     predniSONE (DELTASONE) 20 MG tablet; Take 1 tablet by mouth Daily for 5 days.  Dispense: 5 tablet; Refill: 0  Encouraged patient to monitor oxygen with pulse oximeter.  Go to ER if oxygen is less than 91%.  Continue on inhalers.  She reports that these are very helpful when she feels short of breath.  Will also add prednisone 20 mg daily for 5 days.     You have chosen to receive care through a telehealth visit.  Do you consent to use a video/audio connection for your medical care today? Yes.  Patient is at home, provider is in office during video visit.      Plan of care reviewed with patient at the conclusion of today's visit. Education was provided regarding diagnosis, management and any prescribed or recommended OTC medications.  Patient verbalizes understanding of and agreement with management  plan.    No follow-ups on file.     Zaida De Jesus PA-C

## 2022-02-07 ENCOUNTER — TELEPHONE (OUTPATIENT)
Dept: FAMILY MEDICINE CLINIC | Facility: CLINIC | Age: 42
End: 2022-02-07

## 2022-02-07 DIAGNOSIS — Z11.1 VISIT FOR TB SKIN TEST: Primary | ICD-10-CM

## 2022-02-07 NOTE — TELEPHONE ENCOUNTER
Patient is needing a tb skin test done for work, except she states it has to be done through blood draw. She is needing an order put through from her provider an she requests to be contacted once it has been put in if possible.

## 2022-03-06 DIAGNOSIS — I10 ESSENTIAL HYPERTENSION: ICD-10-CM

## 2022-03-07 ENCOUNTER — OFFICE VISIT (OUTPATIENT)
Dept: FAMILY MEDICINE CLINIC | Facility: CLINIC | Age: 42
End: 2022-03-07

## 2022-03-07 VITALS
HEART RATE: 79 BPM | DIASTOLIC BLOOD PRESSURE: 74 MMHG | SYSTOLIC BLOOD PRESSURE: 126 MMHG | BODY MASS INDEX: 33.75 KG/M2 | OXYGEN SATURATION: 97 % | HEIGHT: 66 IN | WEIGHT: 210 LBS

## 2022-03-07 DIAGNOSIS — Z13.1 SCREENING FOR DIABETES MELLITUS: ICD-10-CM

## 2022-03-07 DIAGNOSIS — I10 ESSENTIAL HYPERTENSION: ICD-10-CM

## 2022-03-07 DIAGNOSIS — Z51.81 ENCOUNTER FOR THERAPEUTIC DRUG LEVEL MONITORING: ICD-10-CM

## 2022-03-07 DIAGNOSIS — Z13.0 SCREENING FOR DEFICIENCY ANEMIA: ICD-10-CM

## 2022-03-07 DIAGNOSIS — J45.40 MODERATE PERSISTENT ASTHMA WITHOUT COMPLICATION: ICD-10-CM

## 2022-03-07 DIAGNOSIS — F41.9 ANXIETY: ICD-10-CM

## 2022-03-07 DIAGNOSIS — Z13.220 SCREENING FOR CHOLESTEROL LEVEL: ICD-10-CM

## 2022-03-07 DIAGNOSIS — Z00.00 PHYSICAL EXAM, ANNUAL: Primary | ICD-10-CM

## 2022-03-07 DIAGNOSIS — E66.09 CLASS 1 OBESITY DUE TO EXCESS CALORIES WITH SERIOUS COMORBIDITY AND BODY MASS INDEX (BMI) OF 33.0 TO 33.9 IN ADULT: ICD-10-CM

## 2022-03-07 DIAGNOSIS — Z23 NEED FOR TDAP VACCINATION: ICD-10-CM

## 2022-03-07 DIAGNOSIS — J30.2 SEASONAL ALLERGIES: ICD-10-CM

## 2022-03-07 PROBLEM — E66.811 CLASS 1 OBESITY DUE TO EXCESS CALORIES WITH SERIOUS COMORBIDITY AND BODY MASS INDEX (BMI) OF 33.0 TO 33.9 IN ADULT: Status: ACTIVE | Noted: 2022-03-07

## 2022-03-07 PROCEDURE — 90715 TDAP VACCINE 7 YRS/> IM: CPT | Performed by: PHYSICIAN ASSISTANT

## 2022-03-07 PROCEDURE — 90471 IMMUNIZATION ADMIN: CPT | Performed by: PHYSICIAN ASSISTANT

## 2022-03-07 PROCEDURE — 99396 PREV VISIT EST AGE 40-64: CPT | Performed by: PHYSICIAN ASSISTANT

## 2022-03-07 RX ORDER — MONTELUKAST SODIUM 10 MG/1
10 TABLET ORAL NIGHTLY
Qty: 90 TABLET | Refills: 1 | Status: SHIPPED | OUTPATIENT
Start: 2022-03-07 | End: 2022-08-19 | Stop reason: SDUPTHER

## 2022-03-07 RX ORDER — AMLODIPINE BESYLATE 5 MG/1
TABLET ORAL
Qty: 90 TABLET | Refills: 0 | OUTPATIENT
Start: 2022-03-07

## 2022-03-07 RX ORDER — ESCITALOPRAM OXALATE 20 MG/1
20 TABLET ORAL DAILY
Qty: 90 TABLET | Refills: 1 | Status: SHIPPED | OUTPATIENT
Start: 2022-03-07 | End: 2022-08-19 | Stop reason: SDUPTHER

## 2022-03-07 RX ORDER — PHENTERMINE HYDROCHLORIDE 37.5 MG/1
37.5 CAPSULE ORAL EVERY MORNING
Qty: 30 CAPSULE | Refills: 2 | Status: SHIPPED | OUTPATIENT
Start: 2022-03-07

## 2022-03-07 RX ORDER — AMLODIPINE BESYLATE 5 MG/1
5 TABLET ORAL DAILY
Qty: 90 TABLET | Refills: 1 | Status: SHIPPED | OUTPATIENT
Start: 2022-03-07 | End: 2022-08-19 | Stop reason: SDUPTHER

## 2022-03-07 RX ORDER — FEXOFENADINE HCL 180 MG/1
180 TABLET ORAL DAILY
Qty: 90 TABLET | Refills: 1 | Status: SHIPPED | OUTPATIENT
Start: 2022-03-07 | End: 2022-08-19 | Stop reason: SDUPTHER

## 2022-03-07 NOTE — PATIENT INSTRUCTIONS
Try tracking medicine on [employer]    The best way to improve your health is to improve your lifestyle with diet, exercise and overall weight loss.     1.  Cut out soda.  2.  Avoid processed foods (anything in a package or can).  Frozen or fresh vegetables and fruit are best.  3.  Avoid refined sugar and simple carbohydrates (candy, baked goods, breads, pastas).  4.  Eat at home and try to avoid eating out at restaurants; especially fast food.  5.  Watch your portion sizes.  6.  Drink more water.  Recommend 8 glasses a day.  7.  Walk at a brisk pace or do cardio for a minimum of 30 minutes a day.  8.  Avoid alcohol or minimize to 2 drinks per day for men, 1 drink per day for woman.  9.  Quit smoking or using tobacco products.

## 2022-03-07 NOTE — PROGRESS NOTES
Patient Care Team:  Zaida De Jesus PA-C as PCP - General (Physician Assistant)  Duy Rodríguez MD as Obstetrician (Obstetrics and Gynecology)     Chief complaint: Patient is in today for a physical     Camilakaz Sena is a 42 y.o. female who presents for her yearly physical exam.     Patient presents for annual physical exam and management of obesity, hypertension, anxiety, seasonal allergies and asthma.  Patient is doing well overall and is compliant on all medications.  Her blood pressure is stable well-controlled.  She has no significant issues with her asthma or allergies as long as she is on prescribed medications.  She reports that her anxiety is stable with Lexapro 20 mg daily.  She is in a new relationship and is very happy.  They are considering having children and she will follow up with her gynecologist regarding this.  She is interested in working on weight loss at this time.  She has tried exercise and diet changes.  She has never tried phentermine.  Her mammogram is up-to-date.  She goes to the dentist and ophthalmologist regularly.  She is due for Tdap.  She will be due for colonoscopy in 2023.       Review of Systems   Constitutional: Negative for chills, diaphoresis, fatigue and fever.   HENT: Negative for congestion, ear pain, hearing loss, postnasal drip, rhinorrhea and sore throat.    Eyes: Negative for blurred vision and pain.   Respiratory: Negative for cough, shortness of breath and wheezing.    Cardiovascular: Negative for chest pain and leg swelling.   Gastrointestinal: Negative for abdominal pain, blood in stool, constipation, diarrhea, nausea, vomiting and indigestion.   Endocrine: Negative for polyuria.   Genitourinary: Negative for dysuria, flank pain and hematuria.   Musculoskeletal: Negative for arthralgias, gait problem and myalgias.   Skin: Negative for rash and skin lesions.   Neurological: Negative for dizziness and headache.   Psychiatric/Behavioral:  Positive for stress. Negative for self-injury, sleep disturbance, suicidal ideas and depressed mood. The patient is not nervous/anxious.         History  Past Medical History:   Diagnosis Date   • Asthma    • GERD (gastroesophageal reflux disease)    • History of BCG vaccination    • History of cervical dysplasia     Resolved w/o any treatment      Past Surgical History:   Procedure Laterality Date   •  SECTION     • LAPAROTOMY EXPLORATORY FOR ECTOPIC PREGNANCY      Side unknown   • LAPAROTOMY EXPLORATORY FOR ECTOPIC PREGNANCY      Side unknown   • SHOULDER SURGERY Right 2020    [facility] orthopedics   • UPPER GASTROINTESTINAL ENDOSCOPY  2018      No Known Allergies   Family History   Problem Relation Age of Onset   • Hypertension Mother    • Migraines Father    • Breast cancer Neg Hx    • Ovarian cancer Neg Hx    • Uterine cancer Neg Hx    • Endometrial cancer Neg Hx    • Colon cancer Neg Hx       Social History     Socioeconomic History   • Marital status: Single     Spouse name: José Antonio   • Number of children: 1   • Years of education: H.S.   • Highest education level: High school graduate   Tobacco Use   • Smoking status: Never Smoker   • Smokeless tobacco: Never Used   Vaping Use   • Vaping Use: Never used   Substance and Sexual Activity   • Alcohol use: Not Currently   • Drug use: Never   • Sexual activity: Yes     Partners: Male     Birth control/protection: Condom      Current Outpatient Medications on File Prior to Visit   Medication Sig Dispense Refill   • albuterol sulfate  (90 Base) MCG/ACT inhaler Inhale 2 puffs Every 4 (Four) Hours As Needed for Wheezing or Shortness of Air. 18 g 1   • omeprazole (priLOSEC) 20 MG capsule Take 1 capsule by mouth Daily. 90 capsule 0   • [DISCONTINUED] amLODIPine (NORVASC) 5 MG tablet Take 1 tablet by mouth Daily. 90 tablet 0   • [DISCONTINUED] escitalopram (LEXAPRO) 20 MG tablet Take 1 tablet by mouth Daily. 90 tablet 1   •  [DISCONTINUED] fexofenadine (ALLEGRA) 180 MG tablet TOME SUPRIYA TABLETA SUPRIYA VEZ AL SARAH POR VIA ORAL 90 tablet 0   • [DISCONTINUED] guaiFENesin-dextromethorphan (ROBITUSSIN DM) 100-10 MG/5ML syrup Take 5 mL by mouth 3 (Three) Times a Day As Needed for Cough or Congestion. 118 mL 1   • [DISCONTINUED] montelukast (SINGULAIR) 10 MG tablet TOME SUPRIYA TABLETA POR VIA ORAL SUPRIYA VEZ CADA NOCHE 90 tablet 0   • [DISCONTINUED] promethazine-dextromethorphan (PROMETHAZINE-DM) 6.25-15 MG/5ML syrup Take 5 mL by mouth At Night As Needed for Cough. 118 mL 0   • [DISCONTINUED] Tiotropium Bromide Monohydrate (SPIRIVA RESPIMAT) 1.25 MCG/ACT aerosol solution inhaler Inhale 2 puffs Daily. 4 g 5     No current facility-administered medications on file prior to visit.       Results for orders placed or performed in visit on 02/07/22   QuantiFERON-TB Gold Plus    Specimen: Blood   Result Value Ref Range    QuantiFERON Incubation Incubation performed.     QUANTIFERON-TB GOLD PLUS Negative Negative   QuantiFERON-TB Gold Plus    Specimen: Blood   Result Value Ref Range    QuantiFERON Criteria Comment     QUANTIFERON TB1 AG VALUE 0.06 IU/mL    QUANTIFERON TB2 AG VALUE 0.09 IU/mL    QuantiFERON Nil Value 0.05 IU/mL    QuantiFERON Mitogen Value >10.00 IU/mL       Health Maintenance   Topic Date Due   • Pneumococcal Vaccine 0-64 (1 of 2 - PPSV23) Never done   • MAMMOGRAM  05/10/2022   • ANNUAL PHYSICAL  03/08/2023   • PAP SMEAR  07/23/2024   • TDAP/TD VACCINES (4 - Td or Tdap) 03/07/2032   • HEPATITIS C SCREENING  Completed   • COVID-19 Vaccine  Completed   • INFLUENZA VACCINE  Completed       Immunization History   Administered Date(s) Administered   • COVID-19 (PFIZER) PURPLE CAP 02/15/2021, 03/11/2021, 11/11/2021   • Flu Vaccine Quad PF >36MO 01/08/2018   • Flu Vaccine Split Quad 01/08/2018   • FluLaval/Fluarix/Fluzone >6 01/08/2018, 02/21/2020, 09/01/2020, 01/04/2022   • FluMist 2-49yrs 01/02/2018   • HPV Quadrivalent 02/07/2020, 02/07/2020,  "09/01/2020, 09/01/2020   • Hepatitis A 03/07/2011   • Hepatitis B Vaccine Adult IM 03/07/2011   • Influenza Quad Vaccine (Inpatient) 03/07/2011   • Influenza, Unspecified 10/17/2019, 09/01/2020   • Tdap 08/01/2010, 03/07/2011, 03/07/2022       Patient's Body mass index is 33.92 kg/m². indicating that she is obese (BMI >30). Obesity-related health conditions include the following: hypertension. Obesity is newly identified. BMI is is above average; BMI management plan is completed. We discussed portion control and increasing exercise..      Results for orders placed or performed in visit on 02/07/22   QuantiFERON-TB Gold Plus    Specimen: Blood   Result Value Ref Range    QuantiFERON Incubation Incubation performed.     QUANTIFERON-TB GOLD PLUS Negative Negative   QuantiFERON-TB Gold Plus    Specimen: Blood   Result Value Ref Range    QuantiFERON Criteria Comment     QUANTIFERON TB1 AG VALUE 0.06 IU/mL    QUANTIFERON TB2 AG VALUE 0.09 IU/mL    QuantiFERON Nil Value 0.05 IU/mL    QuantiFERON Mitogen Value >10.00 IU/mL            Vitals:    03/07/22 0814   BP: 126/74   Pulse: 79   SpO2: 97%   Weight: 95.3 kg (210 lb)   Height: 167.6 cm (65.98\")       Body mass index is 33.92 kg/m².    Physical Exam  Vitals reviewed.   Constitutional:       General: She is not in acute distress.     Appearance: Normal appearance. She is well-developed. She is obese. She is not ill-appearing or diaphoretic.   HENT:      Head: Normocephalic and atraumatic.      Right Ear: Hearing, tympanic membrane, ear canal and external ear normal.      Left Ear: Hearing, tympanic membrane, ear canal and external ear normal.      Nose: Nose normal.      Right Sinus: No maxillary sinus tenderness or frontal sinus tenderness.      Left Sinus: No maxillary sinus tenderness or frontal sinus tenderness.      Mouth/Throat:      Pharynx: Uvula midline.   Eyes:      General: Lids are normal.      Extraocular Movements: Extraocular movements intact.      " Conjunctiva/sclera: Conjunctivae normal.   Neck:      Thyroid: No thyroid mass or thyromegaly.      Trachea: Trachea and phonation normal.   Cardiovascular:      Rate and Rhythm: Normal rate and regular rhythm.      Heart sounds: Normal heart sounds.   Pulmonary:      Effort: Pulmonary effort is normal.      Breath sounds: Normal breath sounds.   Abdominal:      General: Bowel sounds are normal. There is no distension.      Palpations: Abdomen is soft. Abdomen is not rigid.      Tenderness: There is no abdominal tenderness. There is no guarding.   Musculoskeletal:         General: Normal range of motion.      Cervical back: Normal range of motion.      Right lower leg: No edema.      Left lower leg: No edema.   Lymphadenopathy:      Cervical: No cervical adenopathy.      Right cervical: No superficial cervical adenopathy.     Left cervical: No superficial cervical adenopathy.   Skin:     General: Skin is warm.      Findings: No erythema or rash.      Nails: There is no clubbing.   Neurological:      Mental Status: She is alert and oriented to person, place, and time.      Coordination: Coordination normal.      Gait: Gait normal.      Deep Tendon Reflexes: Reflexes are normal and symmetric.      Comments: CN grossly intact   Psychiatric:         Attention and Perception: Attention and perception normal. She is attentive.         Mood and Affect: Mood and affect normal.         Speech: Speech normal.         Behavior: Behavior normal. Behavior is cooperative.         Thought Content: Thought content normal.         Cognition and Memory: Cognition and memory normal.         Judgment: Judgment normal.             Counseling provided on diet and nutrition, exercise, weight management, prevention of sexually transmitted diseases and mental health concerns.    Diagnoses and all orders for this visit:    1. Physical exam, annual (Primary)  PE completed  Preventative labs ordered  Colonoscopy due in 2023  Mammogram -  up-to-date  Gynecologist - goes regularly  Dentist - goes regularly  Ophthalmologist - goes regularly  Tdap today    2. Class 1 obesity due to excess calories with serious comorbidity and body mass index (BMI) of 33.0 to 33.9 in adult  -     phentermine 37.5 MG capsule; Take 1 capsule by mouth Every Morning.  Dispense: 30 capsule; Refill: 2  Will trial phentermine.  Discussed risks vs benefits.  Encouraged ongoing dietary changes and exercise.  UDS, CSC and kimberley today.  Return in 3 months.    3. Essential hypertension  -     amLODIPine (NORVASC) 5 MG tablet; Take 1 tablet by mouth Daily.  Dispense: 90 tablet; Refill: 1  Stable, well-controlled.  Compliant on medication.    4. Anxiety  -     escitalopram (LEXAPRO) 20 MG tablet; Take 1 tablet by mouth Daily.  Dispense: 90 tablet; Refill: 1  Stable with current medication.    5. Moderate persistent asthma without complication  -     Tiotropium Bromide Monohydrate (SPIRIVA RESPIMAT) 1.25 MCG/ACT aerosol solution inhaler; Inhale 2 puffs Daily.  Dispense: 4 g; Refill: 5    6. Seasonal allergies  -     montelukast (SINGULAIR) 10 MG tablet; Take 1 tablet by mouth Every Night.  Dispense: 90 tablet; Refill: 1  -     fexofenadine (ALLEGRA) 180 MG tablet; Take 1 tablet by mouth Daily.  Dispense: 90 tablet; Refill: 1    7. Screening for diabetes mellitus  -     Comprehensive Metabolic Panel; Future    8. Screening for deficiency anemia  -     CBC Auto Differential; Future    9. Screening for cholesterol level  -     Lipid Panel; Future    10. Encounter for therapeutic drug level monitoring  -     Cancel: Compliance Drug Analysis, Ur - Urine, Clean Catch; Future  -     Compliance Drug Analysis, Ur - Urine, Clean Catch  -     Compliance Drug Analysis, Ur - Urine, Clean Catch; Future    11. Need for Tdap vaccination  -     Tdap Vaccine Greater Than or Equal To 6yo IM       Zaida De Jesus PA-C   3/7/2022   09:22 EST

## 2022-03-10 ENCOUNTER — LAB (OUTPATIENT)
Dept: LAB | Facility: HOSPITAL | Age: 42
End: 2022-03-10

## 2022-03-10 DIAGNOSIS — Z13.220 SCREENING FOR CHOLESTEROL LEVEL: ICD-10-CM

## 2022-03-10 DIAGNOSIS — Z13.0 SCREENING FOR DEFICIENCY ANEMIA: ICD-10-CM

## 2022-03-10 DIAGNOSIS — Z13.1 SCREENING FOR DIABETES MELLITUS: ICD-10-CM

## 2022-03-10 LAB
ALBUMIN SERPL-MCNC: 4.7 G/DL (ref 3.5–5.2)
ALBUMIN/GLOB SERPL: 1.7 G/DL
ALP SERPL-CCNC: 80 U/L (ref 39–117)
ALT SERPL W P-5'-P-CCNC: 16 U/L (ref 1–33)
ANION GAP SERPL CALCULATED.3IONS-SCNC: 10 MMOL/L (ref 5–15)
AST SERPL-CCNC: 18 U/L (ref 1–32)
BASOPHILS # BLD AUTO: 0.03 10*3/MM3 (ref 0–0.2)
BASOPHILS NFR BLD AUTO: 0.5 % (ref 0–1.5)
BILIRUB SERPL-MCNC: 0.4 MG/DL (ref 0–1.2)
BUN SERPL-MCNC: 10 MG/DL (ref 6–20)
BUN/CREAT SERPL: 13.9 (ref 7–25)
CALCIUM SPEC-SCNC: 9.3 MG/DL (ref 8.6–10.5)
CHLORIDE SERPL-SCNC: 100 MMOL/L (ref 98–107)
CHOLEST SERPL-MCNC: 223 MG/DL (ref 0–200)
CO2 SERPL-SCNC: 27 MMOL/L (ref 22–29)
CREAT SERPL-MCNC: 0.72 MG/DL (ref 0.57–1)
DEPRECATED RDW RBC AUTO: 43.3 FL (ref 37–54)
EGFRCR SERPLBLD CKD-EPI 2021: 105.9 ML/MIN/1.73
EOSINOPHIL # BLD AUTO: 0.12 10*3/MM3 (ref 0–0.4)
EOSINOPHIL NFR BLD AUTO: 2 % (ref 0.3–6.2)
ERYTHROCYTE [DISTWIDTH] IN BLOOD BY AUTOMATED COUNT: 13.3 % (ref 12.3–15.4)
GLOBULIN UR ELPH-MCNC: 2.7 GM/DL
GLUCOSE SERPL-MCNC: 95 MG/DL (ref 65–99)
HCT VFR BLD AUTO: 39.6 % (ref 34–46.6)
HDLC SERPL-MCNC: 71 MG/DL (ref 40–60)
HGB BLD-MCNC: 12.8 G/DL (ref 12–15.9)
IMM GRANULOCYTES # BLD AUTO: 0.01 10*3/MM3 (ref 0–0.05)
IMM GRANULOCYTES NFR BLD AUTO: 0.2 % (ref 0–0.5)
LDLC SERPL CALC-MCNC: 141 MG/DL (ref 0–100)
LDLC/HDLC SERPL: 1.97 {RATIO}
LYMPHOCYTES # BLD AUTO: 1.71 10*3/MM3 (ref 0.7–3.1)
LYMPHOCYTES NFR BLD AUTO: 28.4 % (ref 19.6–45.3)
MCH RBC QN AUTO: 28.6 PG (ref 26.6–33)
MCHC RBC AUTO-ENTMCNC: 32.3 G/DL (ref 31.5–35.7)
MCV RBC AUTO: 88.4 FL (ref 79–97)
MONOCYTES # BLD AUTO: 0.49 10*3/MM3 (ref 0.1–0.9)
MONOCYTES NFR BLD AUTO: 8.1 % (ref 5–12)
NEUTROPHILS NFR BLD AUTO: 3.67 10*3/MM3 (ref 1.7–7)
NEUTROPHILS NFR BLD AUTO: 60.8 % (ref 42.7–76)
NRBC BLD AUTO-RTO: 0.2 /100 WBC (ref 0–0.2)
PLATELET # BLD AUTO: 258 10*3/MM3 (ref 140–450)
PMV BLD AUTO: 10 FL (ref 6–12)
POTASSIUM SERPL-SCNC: 4.1 MMOL/L (ref 3.5–5.2)
PROT SERPL-MCNC: 7.4 G/DL (ref 6–8.5)
RBC # BLD AUTO: 4.48 10*6/MM3 (ref 3.77–5.28)
SODIUM SERPL-SCNC: 137 MMOL/L (ref 136–145)
TRIGL SERPL-MCNC: 61 MG/DL (ref 0–150)
VLDLC SERPL-MCNC: 11 MG/DL (ref 5–40)
WBC NRBC COR # BLD: 6.03 10*3/MM3 (ref 3.4–10.8)

## 2022-03-10 PROCEDURE — 80053 COMPREHEN METABOLIC PANEL: CPT

## 2022-03-10 PROCEDURE — 80061 LIPID PANEL: CPT

## 2022-03-10 PROCEDURE — 85025 COMPLETE CBC W/AUTO DIFF WBC: CPT

## 2022-03-21 ENCOUNTER — TELEMEDICINE (OUTPATIENT)
Dept: FAMILY MEDICINE CLINIC | Facility: TELEHEALTH | Age: 42
End: 2022-03-21

## 2022-03-21 DIAGNOSIS — N39.0 URINARY TRACT INFECTION WITHOUT HEMATURIA, SITE UNSPECIFIED: Primary | ICD-10-CM

## 2022-03-21 PROCEDURE — 99213 OFFICE O/P EST LOW 20 MIN: CPT | Performed by: NURSE PRACTITIONER

## 2022-03-21 RX ORDER — PHENAZOPYRIDINE HYDROCHLORIDE 200 MG/1
200 TABLET, FILM COATED ORAL 3 TIMES DAILY PRN
Qty: 6 TABLET | Refills: 0 | Status: SHIPPED | OUTPATIENT
Start: 2022-03-21 | End: 2022-03-23

## 2022-03-21 RX ORDER — NITROFURANTOIN 25; 75 MG/1; MG/1
100 CAPSULE ORAL 2 TIMES DAILY
Qty: 14 CAPSULE | Refills: 0 | Status: SHIPPED | OUTPATIENT
Start: 2022-03-21 | End: 2022-03-28

## 2022-03-21 NOTE — PROGRESS NOTES
Mode of Visit: Video  Location of patient: home  You have chosen to receive care through a telehealth visit.  The patient has signed the video visit consent form.  The visit included audio and video interaction. No technical issues occurred during this visit.   Video Visit Reason:   Free Text Description: Pain when urinating  Chief Complaint  Urinary Tract Infection    Subjective          Camila Sena presents to [facility]              Urinary Tract Infection   This is a new problem. Episode onset: 3-4 days. The problem occurs every urination. The problem has been gradually worsening. There has been no fever. Associated symptoms include frequency and urgency. Pertinent negatives include no chills, flank pain, hematuria, nausea or vomiting. Treatments tried: OTC cystex. The treatment provided no relief. There is no history of kidney stones or recurrent UTIs.   Review of Systems   Constitutional: Negative for chills and fever.   Gastrointestinal: Negative for nausea and vomiting.   Genitourinary: Positive for dysuria, frequency and urgency. Negative for flank pain, hematuria and pelvic pain.   Musculoskeletal: Negative for back pain.     Objective   Vital Signs:   There were no vitals taken for this visit.    Physical Exam   Constitutional: She appears well-developed and well-nourished. No distress.   Pulmonary/Chest: Effort normal.   Abdominal: She exhibits no distension. There is no abdominal tenderness.     Result Review :                 Assessment and Plan    Diagnoses and all orders for this visit:    1. Urinary tract infection without hematuria, site unspecified (Primary)  -     nitrofurantoin, macrocrystal-monohydrate, (MACROBID) 100 MG capsule; Take 1 capsule by mouth 2 (Two) Times a Day for 7 days.  Dispense: 14 capsule; Refill: 0  -     phenazopyridine (Pyridium) 200 MG tablet; Take 1 tablet by mouth 3 (Three) Times a Day As Needed for Bladder Spasms for up to 2  days.  Dispense: 6 tablet; Refill: 0        Increase fluids, anything but caffeine since it is a bladder irritant. Follow up with Urgent Care if symptoms worsen or the treatment provided does not resolve the illness. Go to the nearest Emergency Department for any signs of worsening kidney infection, such as severe back or abdominal pain with or without Nausea/Vomiting, fever, chills, hematuria or difficulty urinating.       I spent 20 minutes caring for Camila on this date of service. This time includes time spent by me in the following activities:preparing for the visit, obtaining and/or reviewing a separately obtained history, performing a medically appropriate examination and/or evaluation , counseling and educating the patient/family/caregiver, ordering medications, tests, or procedures, and documenting information in the medical record  Follow Up   Return if symptoms worsen or fail to improve.  Patient was given instructions and counseling regarding her condition or for health maintenance advice. Please see specific information pulled into the AVS if appropriate.

## 2022-06-02 ENCOUNTER — TRANSCRIBE ORDERS (OUTPATIENT)
Dept: ADMINISTRATIVE | Facility: HOSPITAL | Age: 42
End: 2022-06-02

## 2022-06-02 DIAGNOSIS — Z12.31 VISIT FOR SCREENING MAMMOGRAM: Primary | ICD-10-CM

## 2022-06-07 ENCOUNTER — APPOINTMENT (OUTPATIENT)
Dept: MAMMOGRAPHY | Facility: HOSPITAL | Age: 42
End: 2022-06-07

## 2022-08-04 ENCOUNTER — TELEMEDICINE (OUTPATIENT)
Dept: FAMILY MEDICINE CLINIC | Facility: TELEHEALTH | Age: 42
End: 2022-08-04

## 2022-08-04 DIAGNOSIS — R39.89 SUSPECTED UTI: Primary | ICD-10-CM

## 2022-08-04 PROCEDURE — 99213 OFFICE O/P EST LOW 20 MIN: CPT | Performed by: NURSE PRACTITIONER

## 2022-08-04 RX ORDER — NITROFURANTOIN 25; 75 MG/1; MG/1
100 CAPSULE ORAL 2 TIMES DAILY
Qty: 14 CAPSULE | Refills: 0 | Status: SHIPPED | OUTPATIENT
Start: 2022-08-04 | End: 2022-08-11

## 2022-08-04 RX ORDER — PHENAZOPYRIDINE HYDROCHLORIDE 200 MG/1
200 TABLET, FILM COATED ORAL 3 TIMES DAILY PRN
Qty: 6 TABLET | Refills: 0 | Status: SHIPPED | OUTPATIENT
Start: 2022-08-04 | End: 2022-08-06

## 2022-08-04 NOTE — PROGRESS NOTES
You have chosen to receive care through a telehealth visit.  Do you consent to use a video/audio connection for your medical care today? Yes     CHIEF COMPLAINT  No chief complaint on file.        HPI  Camila Sena is a 42 y.o. female  presents with complaint of 1 day history of dysuria, pressure in lower abdomen, frequency, urgency.  She denies fever/chills, n/v, belly/back pain, hematuria, or vaginal symptoms.  She has a history of UTIs that resolve with antibiotic treatment.     Review of Systems   Constitutional: Negative for fever.   Gastrointestinal: Negative for abdominal pain.   Genitourinary: Positive for dysuria, frequency and urgency. Negative for flank pain and hematuria.   Musculoskeletal: Negative for back pain.   All other systems reviewed and are negative.      Past Medical History:   Diagnosis Date   • Asthma    • GERD (gastroesophageal reflux disease) 2016   • History of BCG vaccination    • History of cervical dysplasia 2010    Resolved w/o any treatment   • Urinary tract infection        Family History   Problem Relation Age of Onset   • Hypertension Mother    • Migraines Father    • Breast cancer Neg Hx    • Ovarian cancer Neg Hx    • Uterine cancer Neg Hx    • Endometrial cancer Neg Hx    • Colon cancer Neg Hx        Social History     Socioeconomic History   • Marital status: Single     Spouse name: José Antonio   • Number of children: 1   • Years of education: H.S.   • Highest education level: High school graduate   Tobacco Use   • Smoking status: Never Smoker   • Smokeless tobacco: Never Used   Vaping Use   • Vaping Use: Never used   Substance and Sexual Activity   • Alcohol use: Not Currently   • Drug use: Never   • Sexual activity: Yes     Partners: Male     Birth control/protection: Condom       Camila Sena  reports that she has never smoked. She has never used smokeless tobacco..               There were no vitals taken for this visit.    PHYSICAL EXAM  Physical Exam    Constitutional: She is oriented to person, place, and time. She appears well-developed and well-nourished. She does not have a sickly appearance. She does not appear ill.   HENT:   Head: Normocephalic and atraumatic.   Pulmonary/Chest: Effort normal.  No respiratory distress.  Neurological: She is alert and oriented to person, place, and time.         Diagnoses and all orders for this visit:    1. Suspected UTI (Primary)  -     nitrofurantoin, macrocrystal-monohydrate, (MACROBID) 100 MG capsule; Take 1 capsule by mouth 2 (Two) Times a Day for 7 days.  Dispense: 14 capsule; Refill: 0  -     phenazopyridine (PYRIDIUM) 200 MG tablet; Take 1 tablet by mouth 3 (Three) Times a Day As Needed for Bladder Spasms for up to 2 days.  Dispense: 6 tablet; Refill: 0    --take medications as prescribed  --increase water intake  --f/u in 2-3 days if no improvement      FOLLOW-UP  As discussed during visit with PCP/[facility] if no improvement or Urgent Care/Emergency Department if worsening of symptoms    Patient verbalizes understanding of medication dosage, comfort measures, instructions for treatment and follow-up.    GEORGIE White  08/04/2022  12:51 EDT    The use of a video visit has been reviewed with the patient and verbal informed consent has been obtained. Myself and Camila Sena participated in this visit. The patient is located in [address] [place] [address] [place], [place] [id number].    I am located in [place], [place]. Mychart and Zoom were utilized. I spent 20 minutes in the patient's chart for this visit.

## 2022-08-10 ENCOUNTER — TELEPHONE (OUTPATIENT)
Dept: OBSTETRICS AND GYNECOLOGY | Facility: CLINIC | Age: 42
End: 2022-08-10

## 2022-08-10 NOTE — TELEPHONE ENCOUNTER
Caller: Camila Sena    Relationship to patient: Self    Best call back number: [phone]    Chief complaint: HX OF HPV    Type of visit: ANNUAL    Requested date: ASAP    Additional notes:PT CALLED TO SCHEDULE ANNUAL, NO AVAILABILITY WITH DR CASILLAS UNTIL 04/23.  PT PREFERS MD.  PT REQUESTED CALLBACK TO SEE IF APPT CAN BE SCHEDULED SOONER.

## 2022-08-11 ENCOUNTER — OFFICE VISIT (OUTPATIENT)
Dept: OBSTETRICS AND GYNECOLOGY | Facility: CLINIC | Age: 42
End: 2022-08-11

## 2022-08-11 VITALS
RESPIRATION RATE: 14 BRPM | WEIGHT: 201 LBS | SYSTOLIC BLOOD PRESSURE: 118 MMHG | BODY MASS INDEX: 32.46 KG/M2 | DIASTOLIC BLOOD PRESSURE: 78 MMHG

## 2022-08-11 DIAGNOSIS — N39.0 RECURRENT UTI: ICD-10-CM

## 2022-08-11 DIAGNOSIS — Z01.419 WELL WOMAN EXAM: Primary | ICD-10-CM

## 2022-08-11 DIAGNOSIS — Z71.85 VACCINE COUNSELING: ICD-10-CM

## 2022-08-11 PROBLEM — N87.0 MILD CERVICAL DYSPLASIA: Status: RESOLVED | Noted: 2021-08-08 | Resolved: 2022-08-11

## 2022-08-11 PROBLEM — Z92.29 HISTORY OF BCG VACCINATION: Status: RESOLVED | Noted: 2020-02-07 | Resolved: 2022-08-11

## 2022-08-11 PROBLEM — F41.9 ANXIETY: Status: ACTIVE | Noted: 2020-01-01

## 2022-08-11 PROBLEM — J30.2 SEASONAL ALLERGIES: Status: RESOLVED | Noted: 2020-02-07 | Resolved: 2022-08-11

## 2022-08-11 PROBLEM — J45.40 MODERATE PERSISTENT ASTHMA WITHOUT COMPLICATION: Status: RESOLVED | Noted: 2022-01-04 | Resolved: 2022-08-11

## 2022-08-11 PROBLEM — R87.810 CERVICAL HIGH RISK HUMAN PAPILLOMAVIRUS (HPV) DNA TEST POSITIVE: Status: RESOLVED | Noted: 2019-06-06 | Resolved: 2022-08-11

## 2022-08-11 PROBLEM — I10 ESSENTIAL HYPERTENSION: Status: ACTIVE | Noted: 2020-01-01

## 2022-08-11 PROCEDURE — 99396 PREV VISIT EST AGE 40-64: CPT | Performed by: OBSTETRICS & GYNECOLOGY

## 2022-08-11 NOTE — PROGRESS NOTES
Subjective   Chief Complaint   Patient presents with   • Gynecologic Exam     Camila Edwards is a 42 y.o. year old  presenting to be seen for her annual exam.     SEXUAL Hx:  She is currently sexually active.  In the past year there there has been NO new sexual partners.    Condoms are never used.  She would not like to be screened for STD's at today's exam.  Current birth control method: not using any form of contraception and does not wish to get pregnant.  She is happy with her current method of contraception and does not want to discuss alternative methods of contraception.  MENSTRUAL Hx:  Patient's last menstrual period was 07/15/2022 (approximate).  In the past 6 months her cycles have been regular, predictable and occur monthly.  Will infrequently skip a month.  Her menstrual flow is typically normal.   Each month on average there are roughly 0 day(s) of very heavy flow.  Intermenstrual bleeding is absent.    Post-coital bleeding is absent.  Dysmenorrhea: is not affecting her activities of daily living  PMS: is not affecting her activities of daily living  Her cycles are not a source of concern for her that she wishes to discuss today.  HEALTH Hx:  She exercises regularly: no (but is planning to start exercising more).  She wears her seat belt: yes.  She has concerns about domestic violence: no.  OTHER THINGS SHE WANTS TO DISCUSS TODAY:  Nothing else    The following portions of the patient's history were reviewed and updated as appropriate:problem list, current medications, allergies, past family history, past medical history, past social history and past surgical history.    Social History    Tobacco Use      Smoking status: Never Smoker      Smokeless tobacco: Never Used    Review of Systems  Constitutional POS: nothing reported    NEG: anorexia or night sweats   Genitourinary POS: UTI x 3 past 6 months.  On ABx currently.  [facility]    NEG: dysuria or hematuria       Gastointestinal POS: nothing reported    NEG: bloating, change in bowel habits, melena or reflux symptoms   Integument POS: nothing reported    NEG: moles that are changing in size, shape, color or rashes   Breast POS: nothing reported    NEG: persistent breast lump, skin dimpling or nipple discharge        Objective   /78   Resp 14   Wt 91.2 kg (201 lb)   LMP 07/15/2022 (Approximate)   Breastfeeding No   BMI 32.46 kg/m²     General:  well developed; well nourished  no acute distress   Skin:  No suspicious lesions seen   Thyroid: normal to inspection and palpation   Breasts:  Examined in supine position  Symmetric without masses or skin dimpling  Nipples normal without inversion, lesions or discharge  There are no palpable axillary nodes   Abdomen: soft, non-tender; no masses  no umbilical or inguinal hernias are present  no hepato-splenomegaly   Pelvis: Clinical staff was present for exam  External genitalia:  normal appearance of the external genitalia including [place]th[place]'s and [place]'s glands.  :  urethral meatus normal;  Vaginal:  normal pink mucosa without prolapse or lesions.  Cervix:  normal appearance.  Uterus:  normal size, shape and consistency.  Adnexa:  normal bimanual exam of the adnexa.  Rectal:  digital rectal exam not performed; anus visually normal appearing.        Assessment   1. Normal GYN exam  2. H/o ASCUS with (-) HPV pooled (7/2021)  3. History of recurrent UTI  4. She is up to date on all relevant gynecologic and colorectal screenings     Plan   1. Pap was not done today.  I explained to Camila that the recommendations for Pap smear interval in a low risk patient has lengthened to 3 years time.  I told Camila she still needs to be seen in our office yearly for a full physical including breast and pelvic exam.  2. She was encouraged to get yearly mammograms.  She should report any palpable breast lump(s) or skin changes regardless of mammographic findings.  I explained to Camila  that notification regarding her mammogram results will come from the center performing the study.  Our office will not be routinely calling with mammogram results.  It is her responsibility to make sure that the results from the mammogram are communicated to her by the breast center.  If she has any questions about the results, she is welcome to call our office anytime.  3. Explained the difference between a positive urinalysis and a positive urine culture.  In the future if she starts to have what she perceives to be UTI symptoms, I have encouraged her to call our office so that appropriate test can be ordered.  Have given her instructions on the correct technique for clean-catch midstream urinalysis collection  4. Her vaccine record was reviewed and updated.  5. The importance of keeping all planned follow-up and taking all medications as prescribed was emphasized.  6. Follow up for annual exam 1 year    No orders of the defined types were placed in this encounter.         This note was electronically signed.    Duy Rodríguez M.D.  August 11, 2022    Part of this note may be an electronic transcription/translation of spoken language to printed text using the Dragon Dictation System.

## 2022-08-19 DIAGNOSIS — K21.00 GASTROESOPHAGEAL REFLUX DISEASE WITH ESOPHAGITIS: ICD-10-CM

## 2022-08-19 DIAGNOSIS — E66.09 CLASS 1 OBESITY DUE TO EXCESS CALORIES WITH SERIOUS COMORBIDITY AND BODY MASS INDEX (BMI) OF 33.0 TO 33.9 IN ADULT: ICD-10-CM

## 2022-08-19 DIAGNOSIS — F41.9 ANXIETY: ICD-10-CM

## 2022-08-19 DIAGNOSIS — J30.2 SEASONAL ALLERGIES: ICD-10-CM

## 2022-08-19 DIAGNOSIS — I10 ESSENTIAL HYPERTENSION: ICD-10-CM

## 2022-08-19 RX ORDER — ESCITALOPRAM OXALATE 20 MG/1
20 TABLET ORAL DAILY
Qty: 90 TABLET | Refills: 1 | Status: SHIPPED | OUTPATIENT
Start: 2022-08-19

## 2022-08-19 RX ORDER — MONTELUKAST SODIUM 10 MG/1
10 TABLET ORAL NIGHTLY
Qty: 90 TABLET | Refills: 1 | Status: SHIPPED | OUTPATIENT
Start: 2022-08-19

## 2022-08-19 RX ORDER — AMLODIPINE BESYLATE 5 MG/1
5 TABLET ORAL DAILY
Qty: 90 TABLET | Refills: 1 | Status: SHIPPED | OUTPATIENT
Start: 2022-08-19

## 2022-08-19 RX ORDER — FEXOFENADINE HCL 180 MG/1
180 TABLET ORAL DAILY
Qty: 90 TABLET | Refills: 1 | Status: SHIPPED | OUTPATIENT
Start: 2022-08-19

## 2022-08-19 RX ORDER — OMEPRAZOLE 20 MG/1
20 CAPSULE, DELAYED RELEASE ORAL DAILY
Qty: 90 CAPSULE | Refills: 0 | Status: SHIPPED | OUTPATIENT
Start: 2022-08-19

## 2022-08-19 NOTE — TELEPHONE ENCOUNTER
Rx Refill Note  Requested Prescriptions     Pending Prescriptions Disp Refills   • phentermine 37.5 MG capsule 30 capsule 2     Sig: Take 1 capsule by mouth Every Morning.     Signed Prescriptions Disp Refills   • omeprazole (priLOSEC) 20 MG capsule 90 capsule 0     Sig: Take 1 capsule by mouth Daily.     Authorizing Provider: ABHILASH BLOCK     Ordering User: KATT AMBROCIO   • escitalopram (LEXAPRO) 20 MG tablet 90 tablet 1     Sig: Take 1 tablet by mouth Daily.     Authorizing Provider: ABHILASH BLOCK     Ordering User: KATT AMBROCIO   • amLODIPine (NORVASC) 5 MG tablet 90 tablet 1     Sig: Take 1 tablet by mouth Daily.     Authorizing Provider: ABHILASH BLOCK     Ordering User: KATT AMBROCIO   • montelukast (SINGULAIR) 10 MG tablet 90 tablet 1     Sig: Take 1 tablet by mouth Every Night.     Authorizing Provider: ABHILASH BLOCK     Ordering User: KATT AMBROCIO   • fexofenadine (ALLEGRA) 180 MG tablet 90 tablet 1     Sig: Take 1 tablet by mouth Daily.     Authorizing Provider: ABHILASH BLOCK     Ordering User: KATT AMBROCIO      Last office visit with prescribing clinician: 3/7/2022      Next office visit with prescribing clinician: 9/2/2022            Katt Ambrocio MA  08/19/22, 16:10 EDT

## 2022-08-21 RX ORDER — PHENTERMINE HYDROCHLORIDE 37.5 MG/1
37.5 CAPSULE ORAL EVERY MORNING
Qty: 30 CAPSULE | Refills: 2 | OUTPATIENT
Start: 2022-08-21

## 2022-08-22 NOTE — TELEPHONE ENCOUNTER
Patient needs appointment.  This is a controlled substance and she has to be seen every 3 months for it.  Her last appointment was 3/7/22.

## 2022-08-29 ENCOUNTER — HOSPITAL ENCOUNTER (OUTPATIENT)
Dept: MAMMOGRAPHY | Facility: HOSPITAL | Age: 42
Discharge: HOME | End: 2022-08-29
Admitting: PHYSICIAN ASSISTANT

## 2022-08-29 DIAGNOSIS — Z12.31 VISIT FOR SCREENING MAMMOGRAM: ICD-10-CM

## 2022-08-29 PROCEDURE — 77067 SCR MAMMO BI INCL CAD: CPT | Performed by: RADIOLOGY

## 2022-08-29 PROCEDURE — 77063 BREAST TOMOSYNTHESIS BI: CPT

## 2022-08-29 PROCEDURE — 77067 SCR MAMMO BI INCL CAD: CPT

## 2022-08-29 PROCEDURE — 77063 BREAST TOMOSYNTHESIS BI: CPT | Performed by: RADIOLOGY

## 2022-12-22 DIAGNOSIS — J45.20 MILD INTERMITTENT ASTHMA WITHOUT COMPLICATION: ICD-10-CM

## 2022-12-23 RX ORDER — ALBUTEROL SULFATE 90 UG/1
2 AEROSOL, METERED RESPIRATORY (INHALATION) EVERY 4 HOURS PRN
Qty: 18 G | Refills: 1 | Status: SHIPPED | OUTPATIENT
Start: 2022-12-23

## 2022-12-23 NOTE — TELEPHONE ENCOUNTER
Rx Refill Note  Requested Prescriptions     Pending Prescriptions Disp Refills   • albuterol sulfate  (90 Base) MCG/ACT inhaler 18 g 1     Sig: Inhale 2 puffs Every 4 (Four) Hours As Needed for Wheezing or Shortness of Air.      Last office visit with prescribing clinician: 3/7/2022   Last telemedicine visit with prescribing clinician: Visit date not found   Next office visit with prescribing clinician: Visit date not found                         Would you like a call back once the refill request has been completed: [] Yes [] No    If the office needs to give you a call back, can they leave a voicemail: [] Yes [] No    Jamila Meza MA  12/23/22, 11:58 EST

## 2023-08-11 ENCOUNTER — TRANSCRIBE ORDERS (OUTPATIENT)
Dept: ADMINISTRATIVE | Facility: HOSPITAL | Age: 43
End: 2023-08-11
Payer: COMMERCIAL

## 2023-08-11 DIAGNOSIS — Z12.31 VISIT FOR SCREENING MAMMOGRAM: Primary | ICD-10-CM

## 2023-08-17 ENCOUNTER — OFFICE VISIT (OUTPATIENT)
Dept: OBSTETRICS AND GYNECOLOGY | Facility: CLINIC | Age: 43
End: 2023-08-17
Payer: COMMERCIAL

## 2023-08-17 VITALS
DIASTOLIC BLOOD PRESSURE: 72 MMHG | BODY MASS INDEX: 32.46 KG/M2 | WEIGHT: 201 LBS | RESPIRATION RATE: 14 BRPM | SYSTOLIC BLOOD PRESSURE: 124 MMHG

## 2023-08-17 DIAGNOSIS — Z71.85 VACCINE COUNSELING: ICD-10-CM

## 2023-08-17 DIAGNOSIS — Z01.419 WELL WOMAN EXAM: Primary | ICD-10-CM

## 2023-08-17 DIAGNOSIS — Z12.11 SCREEN FOR COLON CANCER: ICD-10-CM

## 2023-08-17 DIAGNOSIS — B97.7 HPV IN FEMALE: ICD-10-CM

## 2023-08-17 PROBLEM — G89.29 CHRONIC RIGHT SHOULDER PAIN: Status: RESOLVED | Noted: 2020-02-07 | Resolved: 2023-08-17

## 2023-08-17 PROBLEM — M25.511 CHRONIC RIGHT SHOULDER PAIN: Status: RESOLVED | Noted: 2020-02-07 | Resolved: 2023-08-17

## 2023-08-17 PROCEDURE — 99396 PREV VISIT EST AGE 40-64: CPT | Performed by: OBSTETRICS & GYNECOLOGY

## 2023-08-17 RX ORDER — LISINOPRIL 5 MG/1
TABLET ORAL
COMMUNITY

## 2023-08-17 RX ORDER — BUPROPION HYDROCHLORIDE 150 MG/1
150 TABLET, EXTENDED RELEASE ORAL 2 TIMES DAILY
Qty: 60 TABLET | Refills: 2
Start: 2023-08-17

## 2023-08-17 NOTE — PATIENT INSTRUCTIONS
Pneumococcal Conjugate Vaccine (Prevnar 20)    What is this medicine?  PNEUMOCOCCAL VACCINE (RADHA mo MIKE al vak SEEN) is a vaccine. It prevents pneumococcus bacterial infections. These bacteria can cause serious infections like pneumonia, meningitis, and blood infections. This vaccine will not treat an infection and will not cause infection. This vaccine is recommended for adults 18 years and older.  This medicine may be used for other purposes; ask your health care provider or pharmacist if you have questions.  COMMON BRAND NAME(S): Prevnar 20  What should I tell my health care provider before I take this medicine?  They need to know if you have any of these conditions:  bleeding disorder  fever  immune system problems  an unusual or allergic reaction to pneumococcal vaccine, diphtheria toxoid, other vaccines, other medicines, foods, dyes, or preservatives  pregnant or trying to get pregnant  breast-feeding  How should I use this medicine?  This vaccine is injected into a muscle. It is given by a health care provider.  A copy of Vaccine Information Statements will be given before each vaccination. Be sure to read this information carefully each time. This sheet may change often.  Talk to your health care provider about the use of this medicine in children. Special care may be needed.  Overdosage: If you think you have taken too much of this medicine contact a poison control center or emergency room at once.  NOTE: This medicine is only for you. Do not share this medicine with others.  What may interact with this medicine?  medicines for cancer chemotherapy  medicines that suppress your immune function  steroid medicines like prednisone or cortisone  This list may not describe all possible interactions. Give your health care provider a list of all the medicines, herbs, non-prescription drugs, or dietary supplements you use. Also tell them if you smoke, drink alcohol, or use illegal drugs. Some items may interact with  your medicine.  What should I watch for while using this medicine?  Mild fever and pain should go away in 3 days or less. Report any unusual symptoms to your health care provider.  What side effects may I notice from receiving this medicine?  Side effects that you should report to your doctor or health care professional as soon as possible:  allergic reactions (skin rash, itching or hives; swelling of the face, lips, or tongue)  confusion  fast, irregular heartbeat  fever over 102 degrees F  muscle weakness  seizures  trouble breathing  unusual bruising or bleeding  Side effects that usually do not require medical attention (report to your doctor or health care professional if they continue or are bothersome):  headache  joint pain  muscle cramps, pain  pain, tender at site where injected  This list may not describe all possible side effects. Call your doctor for medical advice about side effects. You may report side effects to FDA at 7-689-FDA-5342.  Where should I keep my medicine?  This vaccine is only given by a health care provider. It will not be stored at home.    NOTE: This sheet is a summary. It may not cover all possible information. If you have questions about this medicine, talk to your doctor, pharmacist, or health care provider.  c 2021 [employer] Standard (2021-08-26 16:14:35)

## 2023-08-17 NOTE — PROGRESS NOTES
Subjective   Chief Complaint   Patient presents with    Gynecologic Exam     Camila Edwards is a 43 y.o. year old  presenting to be seen for her annual exam.  This year she has had a couple of UTIs that is been treated.  Currently having no symptoms.    SEXUAL Hx:  She is currently sexually active.  In the past year there there has been NO new sexual partners.    Condoms are always used.  She would not like to be screened for STD's at today's exam.  Current birth control method: condoms.  She is happy with her current method of contraception and does not want to discuss alternative methods of contraception.  MENSTRUAL Hx:  Patient's last menstrual period was 2023 (approximate).  In the past 6 months her cycles have been regular, predictable and occur monthly.  At times she will skip a month.  Her menstrual flow has been heavy some months and light other months.   Each month on average there are roughly 0 day(s) of very heavy flow.  Intermenstrual bleeding is absent.    Post-coital bleeding is absent.  Dysmenorrhea: moderate and is not affecting her activities of daily living  PMS: moderate and is not affecting her activities of daily living  Her cycles are not a source of concern for her that she wishes to discuss today.  HEALTH Hx:  She exercises regularly: yes.  She wears her seat belt: yes.  She has concerns about domestic violence: no.    The following portions of the patient's history were reviewed and updated as appropriate:problem list, current medications, allergies, past family history, past medical history, past social history, and past surgical history.    Social History    Tobacco Use      Smoking status: Never      Smokeless tobacco: Never    Review of Systems  Constitutional POS: nothing reported    NEG: anorexia or night sweats   Genitourinary POS: see HPI    NEG: dysuria or hematuria      Gastointestinal POS: nothing reported    NEG: bloating, change in bowel habits, melena, or  reflux symptoms   Integument POS: nothing reported    NEG: moles that are changing in size, shape, color or rashes   Breast POS: nothing reported    NEG: persistent breast lump, skin dimpling, or nipple discharge        Objective   /72   Resp 14   Wt 91.2 kg (201 lb)   LMP 08/03/2023 (Approximate)   BMI 32.46 kg/mý     General:  well developed; well nourished  no acute distress   Skin:  No suspicious lesions seen   Thyroid: normal to inspection and palpation   Breasts:  Examined in supine position  Symmetric without masses or skin dimpling  Nipples normal without inversion, lesions or discharge  There are no palpable axillary nodes   Abdomen: soft, non-tender; no masses  no umbilical or inguinal hernias are present  no hepato-splenomegaly   Pelvis: Clinical staff was present for exam  External genitalia:  normal appearance of the external genitalia including Bartholin's and Jameson's glands.  :  urethral meatus normal;  Vaginal:  normal pink mucosa without prolapse or lesions.  Cervix:  normal appearance.  Uterus:  normal size, shape and consistency.  Adnexa:  normal bimanual exam of the adnexa.  Rectal:  digital rectal exam not performed; anus visually normal appearing.        Assessment   Normal GYN exam  History of asthma.  Early pneumococcal vaccination may be warranted  Prior history of recurring UTIs currently asymptomatic  History of intermittent menstrual irregularity and dysmenorrhea not impacting activities of daily living.  Prior HPV 16 (+) with last PAP ASCUS with (-) HPV       Plan   Pap was done today.  If she does not receive the results of the Pap within 2 weeks  time, she was instructed to call to find out the results.  I explained to Camila that the recommendations for Pap smear interval in a low risk patient's has lengthened to 3 years time.  I encouraged her to be seen yearly for a full physical exam including breast and pelvic exam even during the off years when PAP's will not be  performed.  She was encouraged to get yearly mammograms.  She should report any palpable breast lump(s) or skin changes regardless of mammographic findings.  I explained to Cmaila that notification regarding her mammogram results will come from the center performing the study.  Our office will not be routinely calling with mammogram results.  It is her responsibility to make sure that the results from the mammogram are communicated to her by the breast center.  If she has any questions about the results, she is welcome to call our office anytime.  Colonoscopy was recommended for screening for colon cancer.  The procedure was briefly discussed and its benefits for early detection of colon cancer were emphasized.  I explained to Camila that we could help her to schedule it if she wishes.  Additionally, she could also contact her primary care physician to help make this arrangement.  Alternatively, she could consider Cologuard (which would need to be done every 3 years).  If Cologuard was abnormal, colonoscopy will be required in follow-up.  In addition to being diagnostic, colonoscopy has the potential to be pre-emptive.  If a precancerous polyp was seen and removed, the chance of that polyp becoming cancerous is essentially eliminated.  Cologuard will not find the precancerous polyps as well as it does colon cancer.  Finally, Cologuard does not provide the opportunity to find and remove the precancerous polyps as readily as does colonoscopy.  After considering these options she wants help setting up her colonoscopy.  Referral will be made to Dr. Pérez for outpatient colonoscopy.  If she does think she has UTI explained the importance of culture to diagnose truly UTI.  Her vaccine record was reviewed and updated.  I discussed with Camila that she may be behind on needed vaccinations for Pneumoccal (PCV20).  She may be able to obtain these vaccinations at her local pharmacy OR speak about obtaining them with her  primary care.  If she does obtain her vaccines, I have asked Camila to let us know the date each vaccine was obtained so that her medical record could be updated in our system.  The importance of keeping all planned follow-up and taking all medications as prescribed was emphasized.  Follow up for annual exam 1 year           This note was electronically signed.    Duy Rodríguez M.D.  August 17, 2023    Part of this note may be an electronic transcription/translation of spoken language to printed text using the Dragon Dictation System.

## 2023-08-21 LAB — REF LAB TEST METHOD: NORMAL

## 2023-08-21 RX ORDER — FLUCONAZOLE 150 MG/1
150 TABLET ORAL DAILY
Qty: 1 TABLET | Refills: 0 | Status: SHIPPED | OUTPATIENT
Start: 2023-08-21

## 2023-09-19 ENCOUNTER — HOSPITAL ENCOUNTER (OUTPATIENT)
Dept: MAMMOGRAPHY | Facility: HOSPITAL | Age: 43
Discharge: HOME | End: 2023-09-19
Admitting: OBSTETRICS & GYNECOLOGY
Payer: COMMERCIAL

## 2023-09-19 DIAGNOSIS — Z12.31 VISIT FOR SCREENING MAMMOGRAM: ICD-10-CM

## 2023-09-19 PROCEDURE — 77063 BREAST TOMOSYNTHESIS BI: CPT | Performed by: RADIOLOGY

## 2023-09-19 PROCEDURE — 77067 SCR MAMMO BI INCL CAD: CPT | Performed by: RADIOLOGY

## 2023-09-19 PROCEDURE — 77067 SCR MAMMO BI INCL CAD: CPT

## 2023-09-19 PROCEDURE — 77063 BREAST TOMOSYNTHESIS BI: CPT

## 2023-09-29 RX ORDER — SODIUM, POTASSIUM,MAG SULFATES 17.5-3.13G
2 SOLUTION, RECONSTITUTED, ORAL ORAL TAKE AS DIRECTED
Qty: 354 ML | Refills: 0 | Status: SHIPPED | OUTPATIENT
Start: 2023-09-29

## 2023-10-13 ENCOUNTER — OUTSIDE FACILITY SERVICE (OUTPATIENT)
Dept: GASTROENTEROLOGY | Facility: CLINIC | Age: 43
End: 2023-10-13
Payer: COMMERCIAL

## 2023-10-13 PROCEDURE — 88305 TISSUE EXAM BY PATHOLOGIST: CPT

## 2023-10-16 ENCOUNTER — LAB REQUISITION (OUTPATIENT)
Dept: LAB | Facility: HOSPITAL | Age: 43
End: 2023-10-16
Payer: COMMERCIAL

## 2023-10-16 DIAGNOSIS — D12.5 BENIGN NEOPLASM OF SIGMOID COLON: ICD-10-CM

## 2023-10-16 DIAGNOSIS — Z12.11 ENCOUNTER FOR SCREENING FOR MALIGNANT NEOPLASM OF COLON: ICD-10-CM

## 2023-10-16 DIAGNOSIS — K57.30 DIVERTICULOSIS OF LARGE INTESTINE WITHOUT PERFORATION OR ABSCESS WITHOUT BLEEDING: ICD-10-CM

## 2023-10-17 LAB — REF LAB TEST METHOD: NORMAL

## 2024-08-19 ENCOUNTER — OFFICE VISIT (OUTPATIENT)
Dept: OBSTETRICS AND GYNECOLOGY | Facility: CLINIC | Age: 44
End: 2024-08-19
Payer: COMMERCIAL

## 2024-08-19 VITALS
RESPIRATION RATE: 14 BRPM | DIASTOLIC BLOOD PRESSURE: 80 MMHG | BODY MASS INDEX: 33.92 KG/M2 | WEIGHT: 210 LBS | SYSTOLIC BLOOD PRESSURE: 128 MMHG

## 2024-08-19 DIAGNOSIS — Z01.419 WELL WOMAN EXAM: Primary | ICD-10-CM

## 2024-08-19 DIAGNOSIS — Z71.85 VACCINE COUNSELING: ICD-10-CM

## 2024-08-19 DIAGNOSIS — N92.6 IRREGULAR MENSES: ICD-10-CM

## 2024-08-19 DIAGNOSIS — N95.1 MENOPAUSAL SYMPTOMS: ICD-10-CM

## 2024-08-19 PROCEDURE — 99396 PREV VISIT EST AGE 40-64: CPT | Performed by: OBSTETRICS & GYNECOLOGY

## 2024-08-19 RX ORDER — ESCITALOPRAM OXALATE 20 MG/1
20 TABLET ORAL DAILY
COMMUNITY

## 2024-08-19 RX ORDER — NORETHINDRONE ACETATE AND ETHINYL ESTRADIOL 1MG-20(21)
1 KIT ORAL DAILY
Qty: 28 TABLET | Refills: 4 | Status: SHIPPED | OUTPATIENT
Start: 2024-08-19 | End: 2025-08-19

## 2024-08-19 NOTE — PROGRESS NOTES
Subjective   Chief Complaint   Patient presents with    Gynecologic Exam     Camila Edwards is a 44 y.o. year old  presenting to be seen for her annual exam.  Hot flashes can be a significant issue at times.  Wonders if there is things that can be done to make it better.    SEXUAL Hx:  She is currently sexually active.  In the past year there there has been NO new sexual partners.    Condoms are never used.  She would not like to be screened for STD's at today's exam.  Current birth control method: rhythm method.  She is happy with her current method of contraception and does not want to discuss alternative methods of contraception.  MENSTRUAL Hx:  Patient's last menstrual period was 2024 (approximate).  In the past 6 months her cycles have been regular, predictable and occur monthly.  Occasionally she will skip a cycle.  Her menstrual flow has been heavy some months and light other months.   Each month on average there are roughly 0 day(s) of very heavy flow.  Intermenstrual bleeding is absent.    Post-coital bleeding is absent.  Dysmenorrhea: is not affecting her activities of daily living  PMS: is not affecting her activities of daily living  Her cycles are not a source of concern for her that she wishes to discuss today.  HEALTH Hx:  She exercises regularly: no (and has no plans to become more active).  She wears her seat belt: yes.  She has concerns about domestic violence: no.    The following portions of the patient's history were reviewed and updated as appropriate:problem list, current medications, allergies, past family history, past medical history, past social history, and past surgical history.    Social History    Tobacco Use      Smoking status: Never      Smokeless tobacco: Never    Review of Systems  Constitutional POS: nothing reported    NEG: anorexia or night sweats   Genitourinary POS: nothing reported    NEG: dysuria or hematuria      Gastointestinal POS: nothing reported     NEG: bloating, change in bowel habits, melena, or reflux symptoms   Integument POS: nothing reported    NEG: moles that are changing in size, shape, color or rashes   Breast POS: nothing reported    NEG: persistent breast lump, skin dimpling, or nipple discharge        Objective   /80   Resp 14   Wt 95.3 kg (210 lb)   LMP 08/05/2024 (Approximate)   BMI 33.92 kg/m²     General:  well developed; well nourished  no acute distress   Skin:  No suspicious lesions seen   Thyroid: normal to inspection and palpation   Breasts:  Examined in supine position  Symmetric without masses or skin dimpling  Nipples normal without inversion, lesions or discharge  There are no palpable axillary nodes   Abdomen: soft, non-tender; no masses  no umbilical or inguinal hernias are present  no hepato-splenomegaly   Pelvis: Clinical staff was present for exam  External genitalia:  normal appearance of the external genitalia including Bartholin's and [place]'s glands.  :  urethral meatus normal;  Vaginal:  normal pink mucosa without prolapse or lesions.  Cervix:  normal appearance.  Uterus:  normal size, shape and consistency.  Adnexa:  normal bimanual exam of the adnexa.  Rectal:  digital rectal exam not performed; anus visually normal appearing.        Assessment   Normal GYN exam  Vasomotor symptoms in perimenopause impacting activities of daily living  Heavy menses with dysmenorrhea  She is up to date on all relevant gynecologic and colorectal screenings     Plan   Pap was not done today.  I explained to Camila that the recommendations for Pap smear interval in a low risk patient has lengthened to 3 years time.  I told Camila she still needs to be seen in our office yearly for a full physical including breast and pelvic exam.  She was encouraged to get yearly mammograms.  She should report any palpable breast lump(s) or skin changes regardless of mammographic findings.  I explained to Camila that notification regarding her  mammogram results will come from the center performing the study.  Our office will not be routinely calling with mammogram results.  It is her responsibility to make sure that the results from the mammogram are communicated to her by the breast center.  If she has any questions about the results, she is welcome to call our office anytime.  I think there is a high probability that a low-dose birth control pill will help both her vasomotor symptoms and menstrual complaints  She was instructed how to start her birth control.  It should be started on Sunday following the onset of her next cycle.  Because it may take 1 month to become effective, the use of alternative contraception for one month was stressed.  The potential for breakthrough bleeding for up to 4 cycles was also emphasized.  I have counseled the patient on the importance of staying up to date with preventive vaccines as well as the risks and benefits of these vaccines.  Her vaccine record was reviewed and updated.  I discussed with Camila that she may be behind on needed vaccinations for Influenza.  She may be able to obtain these vaccinations at her local pharmacy OR speak about obtaining them with her primary care.  If she does obtain her vaccines, I have asked Camila to let us know the date each vaccine was obtained so that her medical record could be updated in our system.  The importance of keeping all planned follow-up and taking all medications as prescribed was emphasized.  Follow up in 3 months time to recheck symptoms      New Medications Ordered This Visit   Medications    norethindrone-ethinyl estradiol FE (Junel FE 1/20) 1-20 MG-MCG per tablet     Sig: Take 1 tablet by mouth Daily.     Dispense:  28 tablet     Refill:  4          This note was electronically signed.    Duy Rodríguez M.D.  August 19, 2024    Part of this note may be an electronic transcription/translation of spoken language to printed text using the Dragon Dictation  System.

## 2024-11-13 ENCOUNTER — HOSPITAL ENCOUNTER (OUTPATIENT)
Dept: MAMMOGRAPHY | Facility: HOSPITAL | Age: 44
Discharge: HOME | End: 2024-11-13
Admitting: FAMILY MEDICINE
Payer: COMMERCIAL

## 2024-11-13 DIAGNOSIS — Z12.31 BREAST CANCER SCREENING BY MAMMOGRAM: ICD-10-CM

## 2024-11-13 DIAGNOSIS — Z01.419 WELL WOMAN EXAM: ICD-10-CM

## 2024-11-13 PROCEDURE — 77067 SCR MAMMO BI INCL CAD: CPT

## 2024-11-13 PROCEDURE — 77063 BREAST TOMOSYNTHESIS BI: CPT

## 2025-04-01 ENCOUNTER — LAB (OUTPATIENT)
Dept: LAB | Facility: HOSPITAL | Age: 45
End: 2025-04-01
Payer: COMMERCIAL

## 2025-04-01 ENCOUNTER — OFFICE VISIT (OUTPATIENT)
Dept: FAMILY MEDICINE CLINIC | Facility: CLINIC | Age: 45
End: 2025-04-01
Payer: COMMERCIAL

## 2025-04-01 VITALS
HEART RATE: 70 BPM | BODY MASS INDEX: 34.97 KG/M2 | WEIGHT: 217.6 LBS | OXYGEN SATURATION: 99 % | SYSTOLIC BLOOD PRESSURE: 122 MMHG | DIASTOLIC BLOOD PRESSURE: 80 MMHG | HEIGHT: 66 IN

## 2025-04-01 DIAGNOSIS — F41.9 ANXIETY: ICD-10-CM

## 2025-04-01 DIAGNOSIS — J30.2 SEASONAL ALLERGIES: ICD-10-CM

## 2025-04-01 DIAGNOSIS — N95.1 PERIMENOPAUSE: ICD-10-CM

## 2025-04-01 DIAGNOSIS — E66.01 CLASS 2 SEVERE OBESITY DUE TO EXCESS CALORIES WITH SERIOUS COMORBIDITY AND BODY MASS INDEX (BMI) OF 35.0 TO 35.9 IN ADULT: ICD-10-CM

## 2025-04-01 DIAGNOSIS — E66.812 CLASS 2 SEVERE OBESITY DUE TO EXCESS CALORIES WITH SERIOUS COMORBIDITY AND BODY MASS INDEX (BMI) OF 35.0 TO 35.9 IN ADULT: ICD-10-CM

## 2025-04-01 DIAGNOSIS — Z76.89 ENCOUNTER TO ESTABLISH CARE: Primary | ICD-10-CM

## 2025-04-01 DIAGNOSIS — I10 ESSENTIAL HYPERTENSION: ICD-10-CM

## 2025-04-01 DIAGNOSIS — J45.40 MODERATE PERSISTENT ASTHMA WITHOUT COMPLICATION: ICD-10-CM

## 2025-04-01 DIAGNOSIS — Z00.00 PHYSICAL EXAM, ANNUAL: ICD-10-CM

## 2025-04-01 PROCEDURE — 84443 ASSAY THYROID STIM HORMONE: CPT

## 2025-04-01 PROCEDURE — 83001 ASSAY OF GONADOTROPIN (FSH): CPT

## 2025-04-01 PROCEDURE — 82746 ASSAY OF FOLIC ACID SERUM: CPT

## 2025-04-01 PROCEDURE — 80061 LIPID PANEL: CPT

## 2025-04-01 PROCEDURE — 80053 COMPREHEN METABOLIC PANEL: CPT

## 2025-04-01 PROCEDURE — 83002 ASSAY OF GONADOTROPIN (LH): CPT

## 2025-04-01 PROCEDURE — 82607 VITAMIN B-12: CPT

## 2025-04-01 PROCEDURE — 85025 COMPLETE CBC W/AUTO DIFF WBC: CPT

## 2025-04-01 PROCEDURE — 83036 HEMOGLOBIN GLYCOSYLATED A1C: CPT

## 2025-04-01 RX ORDER — FEXOFENADINE HCL 180 MG/1
180 TABLET ORAL DAILY
Qty: 90 TABLET | Refills: 3 | Status: SHIPPED | OUTPATIENT
Start: 2025-04-01

## 2025-04-01 RX ORDER — ESCITALOPRAM OXALATE 20 MG/1
20 TABLET ORAL DAILY
Qty: 90 TABLET | Refills: 3 | Status: SHIPPED | OUTPATIENT
Start: 2025-04-01

## 2025-04-01 RX ORDER — ALBUTEROL SULFATE 90 UG/1
2 INHALANT RESPIRATORY (INHALATION) EVERY 4 HOURS PRN
Qty: 18 G | Refills: 1 | Status: SHIPPED | OUTPATIENT
Start: 2025-04-01

## 2025-04-01 RX ORDER — LISINOPRIL 5 MG/1
5 TABLET ORAL DAILY
Qty: 90 TABLET | Refills: 3 | Status: SHIPPED | OUTPATIENT
Start: 2025-04-01

## 2025-04-01 NOTE — PROGRESS NOTES
Chief Complaint   Patient presents with    Establish Care    Allergies       Camila Sena is a pleasant 45 y.o. female who is here to establish care and for annual physical exam.  Patient was previously seeing me back in 2022 until her insurance changed.  She is doing well overall today.  Her blood pressure is stable and well-controlled.  She reports stable mood on Lexapro 20 mg daily.  She has worsening allergies recently and itchy skin.  Needs refill on her Allegra prescription.  She feels she has to use her albuterol inhaler more often as well.  She is not currently taking Spiriva but did well on this in the past.  Pap smear is up-to-date.  Colonoscopy and mammogram are up-to-date.  Reviewed vaccinations.    Past Medical History:   Diagnosis Date    Anxiety     Asthma     Essential hypertension     Female infertility     GERD (gastroesophageal reflux disease)     History of BCG vaccination     History of cervical dysplasia     Resolved w/o any treatment    HPV (human papilloma virus) infection     Hyperthyroidism 2001    Hypothyroid     Migraine     Urinary tract infection     Urogenital trichomoniasis     Varicella        Past Surgical History:   Procedure Laterality Date     SECTION      EXPLORATORY LAPAROTOMY      GYNECOLOGIC CRYOSURGERY      LAPAROTOMY EXPLORATORY FOR ECTOPIC PREGNANCY      Side unknown    LAPAROTOMY EXPLORATORY FOR ECTOPIC PREGNANCY      Side unknown    PELVIC LAPAROSCOPY  ,,     SHOULDER SURGERY Right 2020    [facility] orthopedics    UPPER GASTROINTESTINAL ENDOSCOPY  2018    WISDOM TOOTH EXTRACTION         Family History   Problem Relation Age of Onset    Hypertension Mother     Migraines Father     Uterine cancer Maternal Aunt     Hypertension Brother     Breast cancer Neg Hx     Ovarian cancer Neg Hx     Endometrial cancer Neg Hx     Colon cancer Neg Hx        Social History     Socioeconomic History     "Marital status: Single     Spouse name: José Antonio    Number of children: 1    Years of education: H.S.    Highest education level: High school graduate   Tobacco Use    Smoking status: Never    Smokeless tobacco: Never   Vaping Use    Vaping status: Never Used   Substance and Sexual Activity    Alcohol use: Not Currently    Drug use: Never    Sexual activity: Yes     Partners: Male     Birth control/protection: Condom       No Known Allergies    ROS  Review of Systems   Constitutional:  Positive for fatigue and unexpected weight gain. Negative for chills, diaphoresis and fever.   HENT:  Negative for congestion, ear pain, hearing loss, postnasal drip, rhinorrhea and sore throat.    Eyes:  Negative for blurred vision and pain.   Respiratory:  Negative for cough, shortness of breath and wheezing.    Cardiovascular:  Negative for chest pain and leg swelling.   Gastrointestinal:  Negative for abdominal pain, blood in stool, constipation, diarrhea, nausea, vomiting and indigestion.   Endocrine: Negative for polyuria.   Genitourinary:  Negative for dysuria, flank pain and hematuria.   Musculoskeletal:  Negative for arthralgias, gait problem and myalgias.   Skin:  Negative for rash and skin lesions.   Neurological:  Negative for dizziness and headache.   Psychiatric/Behavioral:  Positive for stress. Negative for self-injury, sleep disturbance, suicidal ideas and depressed mood. The patient is not nervous/anxious.        Vitals:    04/01/25 1238   BP: 122/80   BP Location: Left arm   Patient Position: Sitting   Cuff Size: Adult   Pulse: 70   SpO2: 99%   Weight: 98.7 kg (217 lb 9.6 oz)   Height: 167.6 cm (65.98\")     Body mass index is 35.14 kg/m².    Class 2 Severe Obesity (BMI >=35 and <=39.9). Obesity-related health conditions include the following: hypertension and dyslipidemias. Obesity is unchanged. BMI is is above average; BMI management plan is completed. We discussed portion control, increasing exercise, and " pharmacologic options including GLP-1 .       Current Outpatient Medications on File Prior to Visit   Medication Sig Dispense Refill    [DISCONTINUED] escitalopram (LEXAPRO) 20 MG tablet Take 1 tablet by mouth Daily.      [DISCONTINUED] fexofenadine (ALLEGRA) 180 MG tablet Take 1 tablet by mouth Daily. 90 tablet 1    [DISCONTINUED] lisinopril (PRINIVIL,ZESTRIL) 5 MG tablet TAKE 1 TABLET BY MOUTH EVERY DAY IN THE MORNING FOR HIGH BLOOD PRESSURE      [DISCONTINUED] albuterol sulfate  (90 Base) MCG/ACT inhaler Inhale 2 puffs Every 4 (Four) Hours As Needed for Wheezing or Shortness of Air. (Patient not taking: Reported on 2025) 18 g 1    [DISCONTINUED] buPROPion SR (Wellbutrin SR) 150 MG 12 hr tablet Take 1 tablet by mouth 2 (Two) Times a Day. (Patient not taking: Reported on 2025) 60 tablet 2    [DISCONTINUED] norethindrone-ethinyl estradiol FE (Junel FE ) 1-20 MG-MCG per tablet Take 1 tablet by mouth Daily. (Patient not taking: Reported on 2025) 28 tablet 4    [DISCONTINUED] Tiotropium Bromide Monohydrate (SPIRIVA RESPIMAT) 1.25 MCG/ACT aerosol solution inhaler Inhale 2 puffs Daily. (Patient not taking: Reported on 2025) 4 g 5     No current facility-administered medications on file prior to visit.       Results for orders placed or performed in visit on 10/13/23   TISSUE EXAM, [facility] ([facility])    Collection Time: 10/13/23  1:30 PM    Specimen: Tissue   Result Value Ref Range    Reference Lab Report       [facility]  [address] [place] [address]  Phone: [phone] or [phone]  Fax: [phone]  Beltran Eden M.D., Medical Director    PATIENT NAME                           LABORATORY NO.  ANGELITO ASHFORD.             [id number]  [id number]                         AGE              SEX  SSN           CLIENT REF #  [facility]           43      1980  F    [id number]   [id number]    [address] [place] [address]               REQUESTING M.D.     ATTENDING [place] [address]                VANIA GIRALDO BRADLEY ADALBERTO  DATE COLLECTED      DATE RECEIVED      DATE REPORTED  10/13/2023          10/16/2023         10/17/2023    DIAGNOSIS:  SIGMOID COLON POLYP:  Hyperplastic polyp.    CLINICAL HISTORY:  Diverticulosis of large intestine without perforation or abscess without bleeding,  Benign neoplasm of sigmoid colon, Encounter for screening for malignant  neoplasm of  colon    SPECIMENS RECEIVED:  SIGMOID COLON POLYP    MICROSCOPIC DESCRIPTION:  Tissue blocks are prepared and slides are examined microscopically on all  specimens. See diagnosis for details.    Professional interpretation rendered by Dedrick Bland M.D., BHARATH at  [facility] [place], [address], [place] [address].    GROSS DESCRIPTION:  Labeled sigmoid colon polyp are 3 portions of tan soft tissue measuring 0.7 x 0.4  x 0.2 cm in aggregate, submitted entirely in 1 block.  MTH    REVIEWED, DIAGNOSED AND ELECTRONICALLY  SIGNED BY:    Dedrick Bland M.D., MARLA.  CPT CODES:  24737         PE    Physical Exam  Vitals reviewed.   Constitutional:       General: She is not in acute distress.     Appearance: Normal appearance. She is well-developed. She is obese. She is not ill-appearing or diaphoretic.   HENT:      Head: Normocephalic and atraumatic.      Right Ear: Hearing, tympanic membrane, ear canal and external ear normal.      Left Ear: Hearing, tympanic membrane, ear canal and external ear normal.      Nose: Nose normal.      Right Sinus: No maxillary sinus tenderness or frontal sinus tenderness.      Left Sinus: No maxillary sinus tenderness or frontal sinus tenderness.      Mouth/Throat:      Pharynx: Uvula midline.   Eyes:      General: Lids are normal.      Extraocular Movements: Extraocular movements intact.      Conjunctiva/sclera: Conjunctivae normal.   Neck:      Thyroid: No thyroid mass or  thyromegaly.      Trachea: Trachea and phonation normal.   Cardiovascular:      Rate and Rhythm: Normal rate and regular rhythm.      Heart sounds: Normal heart sounds.   Pulmonary:      Effort: Pulmonary effort is normal.      Breath sounds: Normal breath sounds.   Abdominal:      General: Bowel sounds are normal. There is no distension.      Palpations: Abdomen is soft. Abdomen is not rigid.      Tenderness: There is no abdominal tenderness. There is no guarding.   Musculoskeletal:         General: Normal range of motion.      Cervical back: Normal range of motion.      Right lower leg: No edema.      Left lower leg: No edema.   Lymphadenopathy:      Cervical: No cervical adenopathy.      Right cervical: No superficial cervical adenopathy.     Left cervical: No superficial cervical adenopathy.   Skin:     General: Skin is warm.      Findings: No erythema or rash.      Nails: There is no clubbing.   Neurological:      Mental Status: She is alert and oriented to person, place, and time.      Coordination: Coordination normal.      Gait: Gait normal.      Deep Tendon Reflexes: Reflexes are normal and symmetric.      Comments: CN grossly intact   Psychiatric:         Attention and Perception: Attention and perception normal. She is attentive.         Mood and Affect: Mood and affect normal.         Speech: Speech normal.         Behavior: Behavior normal. Behavior is cooperative.         Thought Content: Thought content normal.         Cognition and Memory: Cognition and memory normal.         Judgment: Judgment normal.         A/P    Diagnoses and all orders for this visit:    1. Encounter to establish care (Primary)    2. Physical exam, annual  -     CBC Auto Differential; Future  -     Comprehensive Metabolic Panel; Future  -     TSH Rfx On Abnormal To Free T4; Future  -     Lipid Panel; Future  -     Hemoglobin A1c; Future    3. Class 2 severe obesity due to excess calories with serious comorbidity and body mass  index (BMI) of 35.0 to 35.9 in adult  -     Vitamin B12 & Folate; Future  Has not tried GLP-1 but is interested in trying this.  No personal or family history of thyroid disease.  Encouraged exercise and eating healthy.  Discussed risks, benefits, adverse reactions and how to titrate medication.  Gave her handout on pricing for [facility].  She will call if she wants to start this.    4. Essential hypertension  -     lisinopril (PRINIVIL,ZESTRIL) 5 MG tablet; Take 1 tablet by mouth Daily.  Dispense: 90 tablet; Refill: 3  Stable, well-controlled.  Compliant on medication.    5. Perimenopause  -     FSH & LH; Future  Has been experiencing irregular menstrual cycles and hot flashes.  No other symptoms.    6. Moderate persistent asthma without complication  -     albuterol sulfate  (90 Base) MCG/ACT inhaler; Inhale 2 puffs Every 4 (Four) Hours As Needed for Wheezing or Shortness of Air.  Dispense: 18 g; Refill: 1  -     Tiotropium Bromide Monohydrate (SPIRIVA RESPIMAT) 1.25 MCG/ACT aerosol solution inhaler; Inhale 2 puffs Daily.  Dispense: 4 g; Refill: 5  Worsening wheezing/shortness of breath.  Restart Allegra and Spiriva.  May need to add in Montelukast.  Call if breathing is not improved with medication.    7. Anxiety  -     escitalopram (LEXAPRO) 20 MG tablet; Take 1 tablet by mouth Daily.  Dispense: 90 tablet; Refill: 3  Stable, well-controlled.  Compliant on medication.    8. Seasonal allergies  -     fexofenadine (ALLEGRA) 180 MG tablet; Take 1 tablet by mouth Daily.  Dispense: 90 tablet; Refill: 3         Plan of care reviewed with patient at the conclusion of today's visit. Education was provided regarding nutrition , exercise, weight management, supplements, preventative screenings, vaccinations, and hypertension diagnosis, management and any prescribed or recommended OTC medications.  Patient verbalizes understanding of and agreement with management plan.    Dictated Utilizing Dragon  Dictation     Please note that portions of this note were completed with a voice recognition program.     Part of this note may be an electronic transcription/translation of spoken language to printed text using the Dragon Dictation System.    Return in about 6 months (around 10/1/2025) for Recheck, 6 month follow-up.     Zaida De Jesus PA-C

## 2025-04-02 LAB
ALBUMIN SERPL-MCNC: 4 G/DL (ref 3.5–5.2)
ALBUMIN/GLOB SERPL: 1 G/DL
ALP SERPL-CCNC: 80 U/L (ref 39–117)
ALT SERPL W P-5'-P-CCNC: 27 U/L (ref 1–33)
ANION GAP SERPL CALCULATED.3IONS-SCNC: 11.5 MMOL/L (ref 5–15)
AST SERPL-CCNC: 28 U/L (ref 1–32)
BASOPHILS # BLD AUTO: 0.03 10*3/MM3 (ref 0–0.2)
BASOPHILS NFR BLD AUTO: 0.5 % (ref 0–1.5)
BILIRUB SERPL-MCNC: 0.2 MG/DL (ref 0–1.2)
BUN SERPL-MCNC: 9 MG/DL (ref 6–20)
BUN/CREAT SERPL: 11.8 (ref 7–25)
CALCIUM SPEC-SCNC: 9.8 MG/DL (ref 8.6–10.5)
CHLORIDE SERPL-SCNC: 103 MMOL/L (ref 98–107)
CHOLEST SERPL-MCNC: 264 MG/DL (ref 0–200)
CO2 SERPL-SCNC: 25.5 MMOL/L (ref 22–29)
CREAT SERPL-MCNC: 0.76 MG/DL (ref 0.57–1)
DEPRECATED RDW RBC AUTO: 45.4 FL (ref 37–54)
EGFRCR SERPLBLD CKD-EPI 2021: 97.4 ML/MIN/1.73
EOSINOPHIL # BLD AUTO: 0.28 10*3/MM3 (ref 0–0.4)
EOSINOPHIL NFR BLD AUTO: 4.2 % (ref 0.3–6.2)
ERYTHROCYTE [DISTWIDTH] IN BLOOD BY AUTOMATED COUNT: 14.2 % (ref 12.3–15.4)
FOLATE SERPL-MCNC: 7.7 NG/ML (ref 4.78–24.2)
FSH SERPL-ACNC: 29.6 MIU/ML
GLOBULIN UR ELPH-MCNC: 4.1 GM/DL
GLUCOSE SERPL-MCNC: 83 MG/DL (ref 65–99)
HBA1C MFR BLD: 5.9 % (ref 4.8–5.6)
HCT VFR BLD AUTO: 42.1 % (ref 34–46.6)
HDLC SERPL-MCNC: 78 MG/DL (ref 40–60)
HGB BLD-MCNC: 13.3 G/DL (ref 12–15.9)
IMM GRANULOCYTES # BLD AUTO: 0.01 10*3/MM3 (ref 0–0.05)
IMM GRANULOCYTES NFR BLD AUTO: 0.2 % (ref 0–0.5)
LDLC SERPL CALC-MCNC: 173 MG/DL (ref 0–100)
LDLC/HDLC SERPL: 2.18 {RATIO}
LH SERPL-ACNC: 25.7 MIU/ML
LYMPHOCYTES # BLD AUTO: 2.4 10*3/MM3 (ref 0.7–3.1)
LYMPHOCYTES NFR BLD AUTO: 36.3 % (ref 19.6–45.3)
MCH RBC QN AUTO: 28.1 PG (ref 26.6–33)
MCHC RBC AUTO-ENTMCNC: 31.6 G/DL (ref 31.5–35.7)
MCV RBC AUTO: 88.8 FL (ref 79–97)
MONOCYTES # BLD AUTO: 0.45 10*3/MM3 (ref 0.1–0.9)
MONOCYTES NFR BLD AUTO: 6.8 % (ref 5–12)
NEUTROPHILS NFR BLD AUTO: 3.45 10*3/MM3 (ref 1.7–7)
NEUTROPHILS NFR BLD AUTO: 52 % (ref 42.7–76)
NRBC BLD AUTO-RTO: 0 /100 WBC (ref 0–0.2)
PLATELET # BLD AUTO: 301 10*3/MM3 (ref 140–450)
PMV BLD AUTO: 9.8 FL (ref 6–12)
POTASSIUM SERPL-SCNC: 4.2 MMOL/L (ref 3.5–5.2)
PROT SERPL-MCNC: 8.1 G/DL (ref 6–8.5)
RBC # BLD AUTO: 4.74 10*6/MM3 (ref 3.77–5.28)
SODIUM SERPL-SCNC: 140 MMOL/L (ref 136–145)
TRIGL SERPL-MCNC: 81 MG/DL (ref 0–150)
TSH SERPL DL<=0.05 MIU/L-ACNC: 4.14 UIU/ML (ref 0.27–4.2)
VIT B12 BLD-MCNC: 306 PG/ML (ref 211–946)
VLDLC SERPL-MCNC: 13 MG/DL (ref 5–40)
WBC NRBC COR # BLD AUTO: 6.62 10*3/MM3 (ref 3.4–10.8)

## 2025-07-05 PROCEDURE — 87086 URINE CULTURE/COLONY COUNT: CPT | Performed by: NURSE PRACTITIONER

## 2025-07-07 ENCOUNTER — RESULTS FOLLOW-UP (OUTPATIENT)
Dept: URGENT CARE | Facility: CLINIC | Age: 45
End: 2025-07-07
Payer: COMMERCIAL

## 2025-07-07 NOTE — TELEPHONE ENCOUNTER
Spoke w/ pt to relay results msg. Pt voiced understanding and plans to f/u with her PCP.     ----- Message from Terrence Campo sent at 7/7/2025 12:23 PM EDT -----  Please inform patient of positive urine culture.  If the patient is not having complete resolution of her symptoms, let us know as soon as possible and we can have the lab perform further workup on   the urine specimen.  ----- Message -----  From: Lab, Background User  Sent: 7/7/2025  10:50 AM EDT  To:  [facility] Post Discharge Resul#

## 2025-07-15 ENCOUNTER — OFFICE VISIT (OUTPATIENT)
Dept: FAMILY MEDICINE CLINIC | Facility: CLINIC | Age: 45
End: 2025-07-15
Payer: COMMERCIAL

## 2025-07-15 VITALS
HEART RATE: 82 BPM | BODY MASS INDEX: 36.87 KG/M2 | SYSTOLIC BLOOD PRESSURE: 136 MMHG | OXYGEN SATURATION: 97 % | WEIGHT: 229.4 LBS | DIASTOLIC BLOOD PRESSURE: 85 MMHG | HEIGHT: 66 IN

## 2025-07-15 DIAGNOSIS — J30.2 SEASONAL ALLERGIES: ICD-10-CM

## 2025-07-15 DIAGNOSIS — R10.9 FLANK PAIN: Primary | ICD-10-CM

## 2025-07-15 DIAGNOSIS — E66.01 CLASS 2 SEVERE OBESITY DUE TO EXCESS CALORIES WITH SERIOUS COMORBIDITY AND BODY MASS INDEX (BMI) OF 37.0 TO 37.9 IN ADULT: ICD-10-CM

## 2025-07-15 DIAGNOSIS — E66.812 CLASS 2 SEVERE OBESITY DUE TO EXCESS CALORIES WITH SERIOUS COMORBIDITY AND BODY MASS INDEX (BMI) OF 37.0 TO 37.9 IN ADULT: ICD-10-CM

## 2025-07-15 LAB
BILIRUB BLD-MCNC: NEGATIVE MG/DL
CLARITY, POC: CLEAR
COLOR UR: NORMAL
EXPIRATION DATE: NORMAL
GLUCOSE UR STRIP-MCNC: NEGATIVE MG/DL
KETONES UR QL: NEGATIVE
LEUKOCYTE EST, POC: NEGATIVE
Lab: NORMAL
NITRITE UR-MCNC: NEGATIVE MG/ML
PH UR: 6 [PH] (ref 5–8)
PROT UR STRIP-MCNC: NEGATIVE MG/DL
RBC # UR STRIP: NEGATIVE /UL
SP GR UR: 1.01 (ref 1–1.03)
UROBILINOGEN UR QL: NORMAL

## 2025-07-15 PROCEDURE — 99214 OFFICE O/P EST MOD 30 MIN: CPT | Performed by: PHYSICIAN ASSISTANT

## 2025-07-15 PROCEDURE — 81003 URINALYSIS AUTO W/O SCOPE: CPT | Performed by: PHYSICIAN ASSISTANT

## 2025-07-15 RX ORDER — CETIRIZINE HYDROCHLORIDE 10 MG/1
10 TABLET ORAL DAILY
Qty: 30 TABLET | Refills: 11 | Status: SHIPPED | OUTPATIENT
Start: 2025-07-15

## 2025-07-15 RX ORDER — TIRZEPATIDE 2.5 MG/.5ML
2.5 INJECTION, SOLUTION SUBCUTANEOUS WEEKLY
Qty: 2 ML | Refills: 0 | Status: SHIPPED | OUTPATIENT
Start: 2025-07-15

## 2025-07-15 NOTE — PROGRESS NOTES
Chief Complaint   Patient presents with    Urinary Tract Infection     Follow up from  states she isn't having any symptoms besides left lower back pain     Allergies     Wants to discuss getting a new medication          Camila Sena is a 45 y.o. female who presents for Urinary Tract Infection (Follow up from  states she isn't having any symptoms besides left lower back pain ) and Allergies (Wants to discuss getting a new medication )    Patient was recently diagnosed with UTI.  Started having upper left back pain yesterday.  All other symptoms resolved.  Completed antibiotic.    Also seen at [facility] for facial swelling along her eyes.  Prescribed Xyzal-D and symptoms resolved.  Was on Allegra, not helping.  Denies any symptoms of asthma.  Not on spiriva currently and not using albuterol frequently.  No swelling in lips/throat/tongue.    She has gained weight and is interested in starting weight loss medication.      Past Medical History:   Diagnosis Date    Anxiety     Asthma     Essential hypertension     Female infertility     GERD (gastroesophageal reflux disease)     History of BCG vaccination     History of cervical dysplasia     Resolved w/o any treatment    HPV (human papilloma virus) infection     Hyperthyroidism 2001    Hypothyroid     Migraine     Urinary tract infection     Urogenital trichomoniasis     Varicella        Past Surgical History:   Procedure Laterality Date     SECTION  2000    EXPLORATORY LAPAROTOMY      GYNECOLOGIC CRYOSURGERY      LAPAROTOMY EXPLORATORY FOR ECTOPIC PREGNANCY      Side unknown    LAPAROTOMY EXPLORATORY FOR ECTOPIC PREGNANCY  1998    Side unknown    PELVIC LAPAROSCOPY  ,,     SHOULDER SURGERY Right 2020    [facility] orthopedics    UPPER GASTROINTESTINAL ENDOSCOPY  2018    WISDOM TOOTH EXTRACTION         Family History   Problem Relation Age of Onset    Hypertension Mother     Migraines Father     Uterine  "cancer Maternal Aunt     Hypertension Brother     Breast cancer Neg Hx     Ovarian cancer Neg Hx     Endometrial cancer Neg Hx     Colon cancer Neg Hx        Social History     Socioeconomic History    Marital status: Single     Spouse name: José Antonio    Number of children: 1    Years of education: H.S.    Highest education level: High school graduate   Tobacco Use    Smoking status: Never    Smokeless tobacco: Never   Vaping Use    Vaping status: Never Used   Substance and Sexual Activity    Alcohol use: Not Currently    Drug use: Never    Sexual activity: Yes     Partners: Male     Birth control/protection: None       No Known Allergies    ROS    Review of Systems   Constitutional:  Negative for chills and fever.   HENT:  Negative for facial swelling and sinus pressure.    Respiratory:  Negative for cough and wheezing.    Genitourinary:  Positive for flank pain. Negative for dysuria, frequency, hematuria and urgency.   Musculoskeletal:  Positive for back pain.       Vitals:    07/15/25 1051   BP: 136/85   Pulse: 82   SpO2: 97%   Weight: 104 kg (229 lb 6.4 oz)   Height: 167.6 cm (65.98\")     Body mass index is 37.04 kg/m².    Current Outpatient Medications on File Prior to Visit   Medication Sig Dispense Refill    albuterol sulfate  (90 Base) MCG/ACT inhaler Inhale 2 puffs Every 4 (Four) Hours As Needed for Wheezing or Shortness of Air. 18 g 1    escitalopram (LEXAPRO) 20 MG tablet Take 1 tablet by mouth Daily. 90 tablet 3    lisinopril (PRINIVIL,ZESTRIL) 5 MG tablet Take 1 tablet by mouth Daily. 90 tablet 3    phenazopyridine (PYRIDIUM) 200 MG tablet       Tiotropium Bromide Monohydrate (SPIRIVA RESPIMAT) 1.25 MCG/ACT aerosol solution inhaler Inhale 2 puffs Daily. 4 g 5    [DISCONTINUED] amoxicillin-clavulanate (AUGMENTIN) 875-125 MG per tablet Take 1 tablet by mouth 2 (Two) Times a Day. 14 tablet 0    [DISCONTINUED] fexofenadine (ALLEGRA) 180 MG tablet Take 1 tablet by mouth Daily. 90 tablet 3    " [DISCONTINUED] levocetirizine (XYZAL) 5 MG tablet Take 1 tablet by mouth Every Evening. 15 tablet 0     No current facility-administered medications on file prior to visit.       Results for orders placed or performed in visit on 07/15/25   POCT urinalysis dipstick, automated    Collection Time: 07/15/25 11:19 AM    Specimen: Urine   Result Value Ref Range    Color Dark Yellow Yellow, Straw, Dark Yellow, Brit    Clarity, UA Clear Clear    Specific Gravity  1.010 1.005 - 1.030    pH, Urine 6.0 5.0 - 8.0    Leukocytes Negative Negative    Nitrite, UA Negative Negative    Protein, POC Negative Negative mg/dL    Glucose, UA Negative Negative mg/dL    Ketones, UA Negative Negative    Urobilinogen, UA Normal Normal, 0.2 E.U./dL    Bilirubin Negative Negative    Blood, UA Negative Negative    Lot Number [id number]     Expiration Date 07/10/2026        PE    Physical Exam  Vitals reviewed.   Constitutional:       General: She is not in acute distress.     Appearance: Normal appearance. She is well-developed. She is obese. She is not ill-appearing or diaphoretic.   HENT:      Head: Normocephalic and atraumatic.   Eyes:      Extraocular Movements: Extraocular movements intact.      Conjunctiva/sclera: Conjunctivae normal.   Pulmonary:      Effort: No respiratory distress.   Abdominal:      Tenderness: There is no left CVA tenderness.   Musculoskeletal:         General: Normal range of motion.        Arms:       Cervical back: Normal range of motion.   Neurological:      General: No focal deficit present.      Mental Status: She is alert.   Psychiatric:         Attention and Perception: She is attentive.         Mood and Affect: Mood normal.         Speech: Speech normal.         Behavior: Behavior normal. Behavior is cooperative.         Thought Content: Thought content normal.         Judgment: Judgment normal.          A/P    Diagnoses and all orders for this visit:    1. Flank pain (Primary)  -     POCT urinalysis  dipstick, automated  Urinalysis is normal.  UTI successfully treated.  Patient reassured.  Left sided back pain is musculoskeletal - TTP on exam.  Recommend ibuprofen/tylenol, stretching and heating pad.    2. Seasonal allergies  -     cetirizine (zyrTEC) 10 MG tablet; Take 1 tablet by mouth Daily.  Dispense: 30 tablet; Refill: 11  Stop allegra.  Trial zyrtec.  If symptoms of facial swelling return, schedule appointment or go to ER if severe.  May need referral to allergist.    3. Class 2 severe obesity due to excess calories with serious comorbidity and body mass index (BMI) of 37.0 to 37.9 in adult  -     Tirzepatide-Weight Management (Zepbound) 2.5 MG/0.5ML solution; Inject 0.5 mL under the skin into the appropriate area as directed 1 (One) Time Per Week.  Dispense: 2 mL; Refill: 0  Has not taken GLP-1 in the past.    Not currently on GLP-1.  No personal or family history of thyroid cancer.  Has tried to lose weight with diet and exercise with no improvement.  Discussed risks, benefits, adverse effects and expectations of medication.  Encouraged healthy eating with protein at meals.  Encouraged hydrating well with water.  Recommend exercising regularly.  150 minutes of cardio a week is ideal and strength training 3 times a week.  Call with any questions or concerns.  Plan on return in 3 months.         Plan of care reviewed with patient at the conclusion of today's visit. Education was provided regarding diagnosis, management and any prescribed or recommended OTC medications.  Patient verbalizes understanding of and agreement with management plan.    Dictated Utilizing Dragon Dictation     Please note that portions of this note were completed with a voice recognition program.     Part of this note may be an electronic transcription/translation of spoken language to printed text using the Dragon Dictation System.    No follow-ups on file.     Zaida De Jesus PA-C

## 2025-07-24 ENCOUNTER — PATIENT MESSAGE (OUTPATIENT)
Dept: FAMILY MEDICINE CLINIC | Facility: CLINIC | Age: 45
End: 2025-07-24
Payer: COMMERCIAL

## 2025-07-24 DIAGNOSIS — E66.01 CLASS 2 SEVERE OBESITY DUE TO EXCESS CALORIES WITH SERIOUS COMORBIDITY AND BODY MASS INDEX (BMI) OF 37.0 TO 37.9 IN ADULT: ICD-10-CM

## 2025-07-24 DIAGNOSIS — E66.812 CLASS 2 SEVERE OBESITY DUE TO EXCESS CALORIES WITH SERIOUS COMORBIDITY AND BODY MASS INDEX (BMI) OF 37.0 TO 37.9 IN ADULT: ICD-10-CM

## 2025-07-25 RX ORDER — TIRZEPATIDE 2.5 MG/.5ML
2.5 INJECTION, SOLUTION SUBCUTANEOUS WEEKLY
Qty: 2 ML | Refills: 0 | Status: SHIPPED | OUTPATIENT
Start: 2025-07-25

## 2025-08-14 DIAGNOSIS — E66.812 CLASS 2 SEVERE OBESITY DUE TO EXCESS CALORIES WITH SERIOUS COMORBIDITY AND BODY MASS INDEX (BMI) OF 37.0 TO 37.9 IN ADULT: ICD-10-CM

## 2025-08-14 DIAGNOSIS — E66.01 CLASS 2 SEVERE OBESITY DUE TO EXCESS CALORIES WITH SERIOUS COMORBIDITY AND BODY MASS INDEX (BMI) OF 37.0 TO 37.9 IN ADULT: ICD-10-CM

## 2025-08-15 DIAGNOSIS — E66.812 CLASS 2 SEVERE OBESITY DUE TO EXCESS CALORIES WITH SERIOUS COMORBIDITY AND BODY MASS INDEX (BMI) OF 37.0 TO 37.9 IN ADULT: ICD-10-CM

## 2025-08-15 DIAGNOSIS — E66.01 CLASS 2 SEVERE OBESITY DUE TO EXCESS CALORIES WITH SERIOUS COMORBIDITY AND BODY MASS INDEX (BMI) OF 37.0 TO 37.9 IN ADULT: ICD-10-CM

## 2025-08-15 RX ORDER — TIRZEPATIDE 2.5 MG/.5ML
INJECTION, SOLUTION SUBCUTANEOUS
Qty: 2 ML | Refills: 0 | OUTPATIENT
Start: 2025-08-15